# Patient Record
Sex: FEMALE | Race: WHITE | Employment: STUDENT | URBAN - METROPOLITAN AREA
[De-identification: names, ages, dates, MRNs, and addresses within clinical notes are randomized per-mention and may not be internally consistent; named-entity substitution may affect disease eponyms.]

---

## 2020-08-25 ENCOUNTER — OFFICE VISIT (OUTPATIENT)
Dept: FAMILY MEDICINE | Facility: CLINIC | Age: 20
End: 2020-08-25

## 2020-08-25 VITALS
WEIGHT: 115 LBS | TEMPERATURE: 98.6 F | SYSTOLIC BLOOD PRESSURE: 107 MMHG | BODY MASS INDEX: 20.38 KG/M2 | HEART RATE: 81 BPM | DIASTOLIC BLOOD PRESSURE: 73 MMHG | OXYGEN SATURATION: 96 % | HEIGHT: 63 IN

## 2020-08-25 DIAGNOSIS — M08.90 CHRONIC ARTHRITIS OF JUVENILE ONSET (H): Primary | ICD-10-CM

## 2020-08-25 DIAGNOSIS — G89.29 CHRONIC PAIN OF LEFT KNEE: ICD-10-CM

## 2020-08-25 DIAGNOSIS — M25.562 CHRONIC PAIN OF LEFT KNEE: ICD-10-CM

## 2020-08-25 RX ORDER — SULFASALAZINE 500 MG/1
TABLET ORAL
COMMUNITY
Start: 2020-06-04 | End: 2021-06-20

## 2020-08-25 RX ORDER — NORGESTIMATE AND ETHINYL ESTRADIOL 0.25-0.035
1 KIT ORAL
COMMUNITY

## 2020-08-25 ASSESSMENT — ANXIETY QUESTIONNAIRES
5. BEING SO RESTLESS THAT IT IS HARD TO SIT STILL: NOT AT ALL
IF YOU CHECKED OFF ANY PROBLEMS ON THIS QUESTIONNAIRE, HOW DIFFICULT HAVE THESE PROBLEMS MADE IT FOR YOU TO DO YOUR WORK, TAKE CARE OF THINGS AT HOME, OR GET ALONG WITH OTHER PEOPLE: NOT DIFFICULT AT ALL
7. FEELING AFRAID AS IF SOMETHING AWFUL MIGHT HAPPEN: NOT AT ALL
2. NOT BEING ABLE TO STOP OR CONTROL WORRYING: NOT AT ALL
6. BECOMING EASILY ANNOYED OR IRRITABLE: NOT AT ALL
1. FEELING NERVOUS, ANXIOUS, OR ON EDGE: NOT AT ALL
3. WORRYING TOO MUCH ABOUT DIFFERENT THINGS: NOT AT ALL
GAD7 TOTAL SCORE: 0

## 2020-08-25 ASSESSMENT — ENCOUNTER SYMPTOMS
CHILLS: 0
FATIGUE: 0
ARTHRALGIAS: 1
FEVER: 0

## 2020-08-25 ASSESSMENT — PAIN SCALES - GENERAL: PAINLEVEL: NO PAIN (0)

## 2020-08-25 ASSESSMENT — PATIENT HEALTH QUESTIONNAIRE - PHQ9
SUM OF ALL RESPONSES TO PHQ QUESTIONS 1-9: 3
5. POOR APPETITE OR OVEREATING: NOT AT ALL

## 2020-08-25 ASSESSMENT — MIFFLIN-ST. JEOR: SCORE: 1255.77

## 2020-08-25 NOTE — PROGRESS NOTES
"       HPI       Shabnam Yu is a 20 year old woman who presents with concerns in regards to her juvenile onset RA.     Diagnosed at 4 years old with RA in all joints, this went into remission when she was 12 until she was in college. At 2nd onset, she only has problems in her left knee. She has had 2 injections in her left knee; with the last one being in July 2020. The prior injection was 01/01/2020. At 11 years of age, she had an injection in her knee as well. She takes Sulfasalazine, not the full dose as it is hard on her stomach. She does have a Rheumatologist, Dr. Cristino Mckenzie in [facility]. She in interested in a second opinion because she wants to ensure that she is on the most cutting treatment.     Problem, Medication and Allergy Lists were reviewed and updated if needed.    Patient is not an established patient of this clinic.           Review of Systems:   Review of Systems     Constitutional:  Negative for fever, chills and fatigue.   Musculoskeletal:  Positive for arthralgias.               Physical Exam:     Vitals:    08/25/20 1316   BP: 107/73   Pulse: 81   Temp: 98.6  F (37  C)   TempSrc: Oral   SpO2: 96%   Weight: 52.2 kg (115 lb)   Height: 1.6 m (5' 3\")     Body mass index is 20.37 kg/m .  Vitals were reviewed and were normal.     Physical Exam  Vitals signs reviewed.   Constitutional:       Appearance: Normal appearance.   HENT:      Head: Normocephalic.   Musculoskeletal: Normal range of motion.      Left knee: She exhibits normal range of motion. Tenderness found.   Skin:     General: Skin is warm and dry.   Neurological:      General: No focal deficit present.      Mental Status: She is alert and oriented to person, place, and time.   Psychiatric:         Mood and Affect: Mood normal.         Behavior: Behavior normal.         Results:   No labs ordered today.   Assessment and Plan     1. Chronic arthritis of juvenile onset (H)    - RHEUMATOLOGY REFERRAL; Future    2. Chronic " pain of left knee    - RHEUMATOLOGY REFERRAL; Future    There are no discontinued medications.  First, see Rheumatology in consult. Next, return to NP clinic as needed. Thank you. Options for treatment and follow-up care were reviewed with the patient. Shabnam Yu  engaged in the decision making process and verbalized understanding of the options discussed and agreed with the final plan.  Leela Sanchez, MILEY, CNP

## 2020-08-25 NOTE — PATIENT INSTRUCTIONS
First, see Rheumatoid in consult. Next, return to NP clinic as needed. Thank you.   [facility] Medication Refill Request Information:  * Please contact your pharmacy regarding ANY request for medication refills.  ** NP Clinic Prescription Fax = [phone]  * Please allow 3 business days for routine medication refills.  * Please allow 5 business days for controlled substance medication refills.     [facility] Test Result notification information:  *You will be notified with in 7-10 days of your appointment day regarding the results of your test.  If you are on MyChart you will be notified as soon as the provider has reviewed the results and signed off on them.    [facility]: [phone]

## 2020-08-25 NOTE — NURSING NOTE
Chief Complaint   Patient presents with     Arthritis     Discuss rheumatoid arthritis.      Depression Response    Patient completed the PHQ-9 assessment for depression and scored >9? No  Question 9 on the PHQ-9 was positive for suicidality? No  Does patient have current mental health provider? No    Is this a virtual visit? Yes   Does patient have suicidal ideation (positive question 9)? No - offer to place Mental Health Referral.  Patient declined referral/not needed    I personally notified the following: visit provider       MARILOU Chapin 1:22 PM  8/25/2020

## 2020-08-26 ASSESSMENT — ANXIETY QUESTIONNAIRES: GAD7 TOTAL SCORE: 0

## 2020-08-27 ENCOUNTER — TELEPHONE (OUTPATIENT)
Dept: RHEUMATOLOGY | Facility: CLINIC | Age: 20
End: 2020-08-27

## 2020-08-27 NOTE — TELEPHONE ENCOUNTER
"Patient is referred by Leela Sanchez NP for 2nd opinion for JRA .    Has patient seen a Rheumatologist in the past? Patient is currently established with Dr. Mckenzie at [facility] but wants to explore all treatment options. She is a student at the [employer] and is also interested in transferring her care to us.     Patient complains of: joint pain, tenderness in left knee    Per referring upon exam:    \"Physical Exam  Vitals signs reviewed.   Constitutional:       Appearance: Normal appearance.   HENT:      Head: Normocephalic.   Musculoskeletal: Normal range of motion.      Left knee: She exhibits normal range of motion. Tenderness found.   Skin:     General: Skin is warm and dry.   Neurological:      General: No focal deficit present.      Mental Status: She is alert and oriented to person, place, and time.   Psychiatric:         Mood and Affect: Mood normal.         Behavior: Behavior normal\"    Has patient had imaging that pertains to referral? Yes, available in Care Everywhere    Has patient had labs that pertains to referral reason: yes, available in Care Everywhere    Spoke with patient and offered an appointment 9/10/2020 at 1pm with Dr. Keith, patient accepted.     Task to sent to schedulers.     Lotus Stone CMA   8/27/2020 12:03 PM      "

## 2020-09-09 NOTE — PROGRESS NOTES
Rheumatology Clinic Visit 1-remote     Shabnam Yu MRN# [id number]   YOB: 2000 Age: 20 year old     Date of Visit: 09/09/2020  Primary care provider: Bebe Bartlett          Assessment and Plan:     # Recurrent oligoarticular inflammatory arthritis: Patient reports recent increase in the frequency and duration of flares affecting the left knee.  Knee exam by video today shows  the left knee displaying a loss of 5 degrees of full flexion without gross effusion. Blood work on June 25, 2020 showed white count of 5.6, hemoglobin of 12.9; ALT normal; CRP normal.  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 There has been excellent response to left knee steroid injections, twice since mid-2019, but symptoms have recurred and have lingered.  Ongoing symptoms suggest that persistent inflammation may be present, and I recommend antirheumatic medication to suppress synovitis recurrence and prevent joint damage.  Sulfasalazine is a solid, evidence-based choice, but the patient reports difficulty with tolerating this medication due to persistent GI AE. Accordingly, I recommend consideration of alternatives to sulfasalazine, including methotrexate or Humira. We discussed risks/benefits/advantages of methotrexate and Humira.  Methotrexate, although orally available and cost effective, requires more frequent laboratory monitoring during use, and cannot be used around the time of conception or pregnancy.  Humira must be injected subcutaneously, and slightly increases risks of opportunistic infection and malignancy.  Both medications would be equally likely to be successful in suppressing inflammatory arthritis, and both have comparable safety profiles.  I would be comfortable with the patient using either approach initially.    Patient described a desire to speak with her parents about  the advisability of starting medication, and to consider the risks benefits, disadvantages, and advantages in greater detail.  I stated that our office will be happy to respond with a prescription if/when the patient is comfortable.  I recommended that she contact our office if she experiences recurrence of flaring knee swelling and pain; in person visit with a view towards aspiration/injection once again could be contemplated.  I recommended renewing basic laboratory studies including liver function test, complete blood count, and creatinine in preparation for starting use of a long-acting antirheumatic medication.    RTC 3 months.    Quang Keith MD  Staff Rheumatologist, [facility]            Active Problem List:     Patient Active Problem List    Diagnosis Date Noted     AYDEN (juvenile idiopathic arthritis) (H) 07/14/2014     Priority: Medium     Myopia 07/14/2014     Priority: Medium            History of Present Illness:   Shabnam Yu presents for 2nd opinion regarding management of juvenile rheumatoid arthritis. She is referred by provider STEPHANY Sanchez.    At the age of 4 patient developed problems with swelling in her knees and hands. Her parents noted alteration in her knee anatomy during soccer practice. She ultimately was diagnosed as having juvenile rheumatoid arthritis with many involved joints (fingers, toes, and knees) and saw Dr. MORALES Silva, a pediatric rheumatologist at [facility]. She was treated with methotrexate and at the age of 11 went into remission. At age 12, she had stopped medication, but her left knee remained swollen. She had an injection in the left knee.    Since then, she reports several instances of swelling and pain in her knee. Those were short lived. In late 2019 she began having recurrent difficulties with pain and swelling in the left knee.  She saw Dr. Mckenzie in rheumatology at [facility].  Flare of inflammatory arthritis in the left knee was  "diagnosed, and the knee was aspirated and injected.  She saw Dr. Mckenzie and follow-up virtually on Patito 3, 2020.  At that time excellent and durable response to   left knee injections was reported, but the left knee had again become swollen, stiff, and painful.  At that point, the plan was to use scheduled naproxen and to start sulfasalazine.  In July, she followed up with Dr. Mckenzie, and reported poor tolerance of naproxen and intermittent continued left knee swelling and pain.  Dr. Mckenzie recommended advancing the dose of sulfasalazine.    On July 11, the L knee again swelling, and was aspirated and injected again. All the swelling has since disappeared. She does not have pain on a daily basis. She has been hiking and canoeing without difficulty. Somestimes, the left knee feels \"off\" compared to the R, but no swelling/loss of ROM. No redness/warmth.    She has had no problems with pain or swelling in joints elsewhere recently with the exception of pain in the right TMJ region.    In the recent past she has been using 600 mg twice daily of ibuprofen. She developed stomach upset and chest pain. She stopped it and that has improved. Sulfasalazine has also been difficult to take because of heartburn, and she has taken it only occasionally at reduced doses since visiting with Dr. Mckenzie.    She has been under increasing stress at school. She has had no history of psoriasis or photosensitivity. There is no history of iritis or uveitis, dry eyes or dry mouth, she has had no problems with pleurisy or asthma. There is no history of stomach ulcers or inflammatory bowel disease. She has had no problems with kidney or liver disease, diabetes or thyroid disease, cytopenias or neurologic disease. There is no history of hypertension.            Review of Systems:       Constitutional: occasional low-grade fevers, no chills/sweats.  Skin: small red bumps on the backs of her fingers.  Eyes: no eye pain or " vision change  Ears/Nose/Throat: negative  Respiratory: she has SOB, associated with anxiety  Cardiovascular: negative  Gastrointestinal: + GERD symptoms.  Genitourinary: negative  Musculoskeletal: negative  Neurologic: negative  Psychiatric: negative  Hematologic/Lymphatic/Immunologic: swollen neck gland  Endocrine: negative          Past Medical History:     Past Medical History:   Diagnosis Date     Anxiety      Depressive disorder      Juvenile arthritis (H) 01/2004     Past Surgical History:   Procedure Laterality Date     CARDIAC SURGERY N/A 01/2004     # congenital heart disease: septal defect which was treated with a coiling procedure at age 4.  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  Activity: She walks regularly at school. Has not been exercising regularly. Used to run call cross-country in high school.         Family History:     Family History   Problem Relation Age of Onset     Cancer Maternal Grandmother      Cancer Paternal Grandmother      Cancer Paternal Grandfather      Family History: Negative with respect to inflammatory disease.       Allergies:   No Known Allergies         Medications:     Current Outpatient Medications   Medication Sig Dispense Refill     norgestimate-ethinyl estradiol (ORTHO-CYCLEN) 0.25-35 MG-MCG tablet Take 1 tablet by mouth       omeprazole (PRILOSEC) 20 MG DR capsule Take 20 mg by mouth       sertraline (ZOLOFT) 50 MG tablet Take 50 mg by mouth daily       sulfaSALAzine (AZULFIDINE) 500 MG tablet 500 mg twice daily for 1 week, then 1000  mg twice daily.       SULFASALAZINE dosin mg once or rarely twice daily.       Physical Exam:   There were no vitals taken for this visit.  Wt Readings from Last 4 Encounters:   20 52.2 kg (115 lb)       Constitutional: well-developed, appearing stated age; cooperative  Eyes: nl EOM, PERRLA, vision, conjunctiva, sclera  ENT: nl external ears, nose, hearing, lips, teeth, gums, throat  No mucous membrane lesions, normal saliva pool  Neck: no mass or thyroid enlargement  Resp: lungs clear to auscultation, nl to palpation  CV: RRR, no murmurs, rubs or gallops, no edema  GI: no ABD mass or tenderness, no HSM  : not tested  Lymph: no cervical, supraclavicular, inguinal or epitrochlear nodes  MS: The TMJ, neck, shoulder, elbow, wrist, MCP/PIP/DIP, spine, hip, knee, ankle, and foot MTP/IP joints were examined and found normal. L knee lacks 5 degrees of active flexion; no visible swelling or warmth. Otherwise, joint ROM is entirely normal.  Skin: no nail pitting, alopecia, rash, nodules or lesions  Neuro: nl cranial nerves, strength, sensation, DTRs.   Psych: nl judgement, orientation, memory, affect.         Data:     RHEUM RESULTS Latest Ref Rng & Units 2010 2011 6/10/2011   SED RATE 0 - 15 mm/h 10 10 8   AST 0 - 50 U/L - - -   ALT 0 - 50 U/L 24 24 9   ALBUMIN 3.9 - 5.1 g/dL - - -   WBC 4.0 - 11.0 10e9/L 4.3 4.4 5.4   RBC 3.7 - 5.3 10e12/L 4.22 4.53 4.06   HGB 11.7 - 15.7 g/dL 13.1 13.9 12.8   HCT 35.0 - 47.0 % 38.0 40.3 36.4   MCV 77 - 100 fl 90 89 90   MCHC 31.5 - 36.5 g/dL 34.5 34.5 35.2   RDW 10.0 - 15.0 % 13.0 13.1 12.6    - 450 10e9/L 238 229 196   CREATININE 0.39 - 0.73 mg/dL 0.59 0.64 0.56   GFR ESTIMATE, IF BLACK mL/min/1.7m2 GFR not calculated, patient <16 years old. GFR not calculated, patient <16 years old. GFR not calculated, patient <16 years old.   GFR ESTIMATE mL/min/1.7m2 GFR not calculated, patient <16 years old. GFR not calculated, patient <16 years old. GFR not  "calculated, patient <16 years old.         Reviewed Rheumatology lab flowsheet    Patient states she is not consistent in the use of sulfasalazine as it upsets her stomach.    Shabnam Yu is a 20 year old female who is being evaluated via a billable video visit.      The patient has been notified of following:     \"This video visit will be conducted via a call between you and your physician/provider. We have found that certain health care needs can be provided without the need for an in-person physical exam.  This service lets us provide the care you need with a video conversation.  If a prescription is necessary we can send it directly to your pharmacy.  If lab work is needed we can place an order for that and you can then stop by our lab to have the test done at a later time.    Video visits are billed at different rates depending on your insurance coverage.  Please reach out to your insurance provider with any questions.    If during the course of the call the physician/provider feels a video visit is not appropriate, you will not be charged for this service.\"    Patient has given verbal consent for Video visit? Yes  How would you like to obtain your AVS? MyChart    Will anyone else be joining your video visit? No        Video-Visit Details    Type of service:  Video Visit    Video Start Time: 1:07 PM  Video End Time: 2:03 PM    Originating Location (pt. Location): Home    Distant Location (provider location):   [facility]     Platform used for Video Visit: Arvin Keith MD        "

## 2020-09-10 ENCOUNTER — VIRTUAL VISIT (OUTPATIENT)
Dept: RHEUMATOLOGY | Facility: CLINIC | Age: 20
End: 2020-09-10
Attending: NURSE PRACTITIONER
Payer: COMMERCIAL

## 2020-09-10 DIAGNOSIS — M25.562 CHRONIC PAIN OF LEFT KNEE: ICD-10-CM

## 2020-09-10 DIAGNOSIS — M08.90 CHRONIC ARTHRITIS OF JUVENILE ONSET (H): ICD-10-CM

## 2020-09-10 DIAGNOSIS — G89.29 CHRONIC PAIN OF LEFT KNEE: ICD-10-CM

## 2020-09-10 SDOH — HEALTH STABILITY: MENTAL HEALTH: HOW OFTEN DO YOU HAVE A DRINK CONTAINING ALCOHOL?: 2-3 TIMES A WEEK

## 2020-09-10 ASSESSMENT — PAIN SCALES - GENERAL: PAINLEVEL: NO PAIN (0)

## 2020-09-10 NOTE — PATIENT INSTRUCTIONS
Diagnosis:  1.  Recurrent oligoarticular inflammatory arthritis, likely due to juvenile idiopathic arthritis, largely affecting the left knee only.  There is been excellent response to left knee injections twice in the past year, but symptoms have recurred and have lingered.  Ongoing symptoms suggest that deeper inflammation may be present, and I recommend antirheumatic medication.  Sulfasalazine is a decent choice, but I understand that you have had difficulty with tolerating this medication, and therefore I recommend switching to 1 of 2 options, methotrexate or Humira.  Humira is the biologic and must be injected; methotrexate is a pill or liquid that can be taken orally once a week.  Both medications would be equally likely to be successful in suppressing inflammatory arthritis, and both have comparable safety profiles.    Plan:  1.  After you have decided which of these options you would like to pursue, (if either), undergo blood work to check liver function, kidney function complete blood count, and markers of infection, before starting a new medication.  2.  For now discontinue sulfasalazine, unless you are strongly interested in pursuing the enteric-coated version.  In that case, I will prescribe enteric-coated sulfasalazine and recommend 1000 mg twice daily.  3.  Let my office know if you have recurrence of knee swelling or pain, or other joint related difficulties.  Our office will wait for your indication about when to start medication when you have had a chance to discuss these matters with family.

## 2020-09-10 NOTE — LETTER
9/10/2020       RE: Shabnam Yu  [address] [facility] [address]     Dear Colleague,    Thank you for referring your patient, Shabnam Yu, to the [facility] RHEUMATOLOGY at [facility]. Please see a copy of my visit note below.    Rheumatology Clinic Visit 1-remote     Shabnam Yu MRN# [id number]   YOB: 2000 Age: 20 year old     Date of Visit: 09/09/2020  Primary care provider: Bebe Bartlett          Assessment and Plan:     # Recurrent oligoarticular inflammatory arthritis: Patient reports recent increase in the frequency and duration of flares affecting the left knee.  Knee exam by video today shows  the left knee displaying a loss of 5 degrees of full flexion without gross effusion. Blood work on June 25, 2020 showed white count of 5.6, hemoglobin of 12.9; ALT normal; CRP normal.  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 There has been excellent response to left knee steroid injections, twice since mid-2019, but symptoms have recurred and have lingered.  Ongoing symptoms suggest that persistent inflammation may be present, and I recommend antirheumatic medication to suppress synovitis recurrence and prevent joint damage.  Sulfasalazine is a solid, evidence-based choice, but the patient reports difficulty with tolerating this medication due to persistent GI AE. Accordingly, I recommend consideration of alternatives to sulfasalazine, including methotrexate or Humira. We discussed risks/benefits/advantages of methotrexate and Humira.  Methotrexate, although orally available and cost effective, requires more frequent laboratory monitoring during use, and cannot be used around the time of conception or pregnancy.  Humira must be injected subcutaneously, and slightly increases risks of opportunistic infection  and malignancy.  Both medications would be equally likely to be successful in suppressing inflammatory arthritis, and both have comparable safety profiles.  I would be comfortable with the patient using either approach initially.    Patient described a desire to speak with her parents about the advisability of starting medication, and to consider the risks benefits, disadvantages, and advantages in greater detail.  I stated that our office will be happy to respond with a prescription if/when the patient is comfortable.  I recommended that she contact our office if she experiences recurrence of flaring knee swelling and pain; in person visit with a view towards aspiration/injection once again could be contemplated.  I recommended renewing basic laboratory studies including liver function test, complete blood count, and creatinine in preparation for starting use of a long-acting antirheumatic medication.    RTC 3 months.    Quang Keith MD  Staff Rheumatologist, [facility]            Active Problem List:     Patient Active Problem List    Diagnosis Date Noted     AYDEN (juvenile idiopathic arthritis) (H) 07/14/2014     Priority: Medium     Myopia 07/14/2014     Priority: Medium            History of Present Illness:   Shabnam Yu presents for 2nd opinion regarding management of juvenile rheumatoid arthritis. She is referred by provider STEPHANY Sanchez.    At the age of 4 patient developed problems with swelling in her knees and hands. Her parents noted alteration in her knee anatomy during soccer practice. She ultimately was diagnosed as having juvenile rheumatoid arthritis with many involved joints (fingers, toes, and knees) and saw Dr. MORALES Silva, a pediatric rheumatologist at [facility]. She was treated with methotrexate and at the age of 11 went into remission. At age 12, she had stopped medication, but her left knee remained swollen. She had an injection in the left knee.    Since then, she  "reports several instances of swelling and pain in her knee. Those were short lived. In late 2019 she began having recurrent difficulties with pain and swelling in the left knee.  She saw Dr. Mckenzie in rheumatology at [facility].  Flare of inflammatory arthritis in the left knee was diagnosed, and the knee was aspirated and injected.  She saw Dr. Mckenzie and follow-up virtually on Patito 3, 2020.  At that time excellent and durable response to   left knee injections was reported, but the left knee had again become swollen, stiff, and painful.  At that point, the plan was to use scheduled naproxen and to start sulfasalazine.  In July, she followed up with Dr. Mckenzie, and reported poor tolerance of naproxen and intermittent continued left knee swelling and pain.  Dr. Mckenzie recommended advancing the dose of sulfasalazine.    On July 11, the L knee again swelling, and was aspirated and injected again. All the swelling has since disappeared. She does not have pain on a daily basis. She has been hiking and canoeing without difficulty. Somestimes, the left knee feels \"off\" compared to the R, but no swelling/loss of ROM. No redness/warmth.    She has had no problems with pain or swelling in joints elsewhere recently with the exception of pain in the right TMJ region.    In the recent past she has been using 600 mg twice daily of ibuprofen. She developed stomach upset and chest pain. She stopped it and that has improved. Sulfasalazine has also been difficult to take because of heartburn, and she has taken it only occasionally at reduced doses since visiting with Dr. Mckenzie.    She has been under increasing stress at school. She has had no history of psoriasis or photosensitivity. There is no history of iritis or uveitis, dry eyes or dry mouth, she has had no problems with pleurisy or asthma. There is no history of stomach ulcers or inflammatory bowel disease. She has had no problems with kidney or " liver disease, diabetes or thyroid disease, cytopenias or neurologic disease. There is no history of hypertension.            Review of Systems:       Constitutional: occasional low-grade fevers, no chills/sweats.  Skin: small red bumps on the backs of her fingers.  Eyes: no eye pain or vision change  Ears/Nose/Throat: negative  Respiratory: she has SOB, associated with anxiety  Cardiovascular: negative  Gastrointestinal: + GERD symptoms.  Genitourinary: negative  Musculoskeletal: negative  Neurologic: negative  Psychiatric: negative  Hematologic/Lymphatic/Immunologic: swollen neck gland  Endocrine: negative          Past Medical History:     Past Medical History:   Diagnosis Date     Anxiety      Depressive disorder      Juvenile arthritis (H) 01/2004     Past Surgical History:   Procedure Laterality Date     CARDIAC SURGERY N/A 01/2004     # congenital heart disease: septal defect which was treated with a coiling procedure at age 4.  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.  Activity: She walks regularly at school. Has not been exercising regularly. Used to run call cross-country in high school.         Family History:     Family History   Problem Relation Age of Onset     Cancer Maternal Grandmother      Cancer Paternal Grandmother      Cancer Paternal Grandfather      Family History: Negative with respect to inflammatory disease.       Allergies:   No Known Allergies         Medications:     Current Outpatient Medications   Medication Sig Dispense Refill     norgestimate-ethinyl  estradiol (ORTHO-CYCLEN) 0.25-35 MG-MCG tablet Take 1 tablet by mouth       omeprazole (PRILOSEC) 20 MG DR capsule Take 20 mg by mouth       sertraline (ZOLOFT) 50 MG tablet Take 50 mg by mouth daily       sulfaSALAzine (AZULFIDINE) 500 MG tablet 500 mg twice daily for 1 week, then 1000 mg twice daily.       SULFASALAZINE dosin mg once or rarely twice daily.       Physical Exam:   There were no vitals taken for this visit.  Wt Readings from Last 4 Encounters:   20 52.2 kg (115 lb)       Constitutional: well-developed, appearing stated age; cooperative  Eyes: nl EOM, PERRLA, vision, conjunctiva, sclera  ENT: nl external ears, nose, hearing, lips, teeth, gums, throat  No mucous membrane lesions, normal saliva pool  Neck: no mass or thyroid enlargement  Resp: lungs clear to auscultation, nl to palpation  CV: RRR, no murmurs, rubs or gallops, no edema  GI: no ABD mass or tenderness, no HSM  : not tested  Lymph: no cervical, supraclavicular, inguinal or epitrochlear nodes  MS: The TMJ, neck, shoulder, elbow, wrist, MCP/PIP/DIP, spine, hip, knee, ankle, and foot MTP/IP joints were examined and found normal. L knee lacks 5 degrees of active flexion; no visible swelling or warmth. Otherwise, joint ROM is entirely normal.  Skin: no nail pitting, alopecia, rash, nodules or lesions  Neuro: nl cranial nerves, strength, sensation, DTRs.   Psych: nl judgement, orientation, memory, affect.         Data:     RHEUM RESULTS Latest Ref Rng & Units 2010 2011 6/10/2011   SED RATE 0 - 15 mm/h 10 10 8   AST 0 - 50 U/L - - -   ALT 0 - 50 U/L 24 24 9   ALBUMIN 3.9 - 5.1 g/dL - - -   WBC 4.0 - 11.0 10e9/L 4.3 4.4 5.4   RBC 3.7 - 5.3 10e12/L 4.22 4.53 4.06   HGB 11.7 - 15.7 g/dL 13.1 13.9 12.8   HCT 35.0 - 47.0 % 38.0 40.3 36.4   MCV 77 - 100 fl 90 89 90   MCHC 31.5 - 36.5 g/dL 34.5 34.5 35.2   RDW 10.0 - 15.0 % 13.0 13.1 12.6    - 450 10e9/L 238 229 196   CREATININE 0.39 - 0.73 mg/dL 0.59 0.64 0.56   GFR  "ESTIMATE, IF BLACK mL/min/1.7m2 GFR not calculated, patient <16 years old. GFR not calculated, patient <16 years old. GFR not calculated, patient <16 years old.   GFR ESTIMATE mL/min/1.7m2 GFR not calculated, patient <16 years old. GFR not calculated, patient <16 years old. GFR not calculated, patient <16 years old.         Reviewed Rheumatology lab flowsheet    Patient states she is not consistent in the use of sulfasalazine as it upsets her stomach.    Shabnam Yu is a 20 year old female who is being evaluated via a billable video visit.      The patient has been notified of following:     \"This video visit will be conducted via a call between you and your physician/provider. We have found that certain health care needs can be provided without the need for an in-person physical exam.  This service lets us provide the care you need with a video conversation.  If a prescription is necessary we can send it directly to your pharmacy.  If lab work is needed we can place an order for that and you can then stop by our lab to have the test done at a later time.    Video visits are billed at different rates depending on your insurance coverage.  Please reach out to your insurance provider with any questions.    If during the course of the call the physician/provider feels a video visit is not appropriate, you will not be charged for this service.\"    Patient has given verbal consent for Video visit? Yes  How would you like to obtain your AVS? MyChart    Will anyone else be joining your video visit? No        Video-Visit Details    Type of service:  Video Visit    Video Start Time: 1:07 PM  Video End Time: 2:03 PM    Originating Location (pt. Location): Home    Distant Location (provider location):  [facility] RHEUMATOLOGY     Platform used for Video Visit: Arvin Keith MD        "

## 2020-09-16 ENCOUNTER — TELEPHONE (OUTPATIENT)
Dept: RHEUMATOLOGY | Facility: CLINIC | Age: 20
End: 2020-09-16

## 2020-09-16 NOTE — TELEPHONE ENCOUNTER
[facility] Call Center    Phone Message    May a detailed message be left on voicemail: yes     Reason for Call: Other: Pt needs ASAP appt for knee. Pain is worsening. Please call her to discuss     Action Taken: Message routed to:  [facility] Rheum    Travel Screening: Not Applicable

## 2020-09-17 NOTE — TELEPHONE ENCOUNTER
Spoke to Shabnam and she is reporting that she is having pain and swelling in her left knee which started a few days ago.  The area does feel warm to the touch, is not red.  When her left knee is swollen, it does make this very difficult for her to walk.  She has tried ice but this has not help and sometimes she does wear a compression brace but does not help.      Shabnam is not taking anything over the counter.    Shabnam would like to have an onsite appointment with Dr. Keith with her family to discuss medication options and Shabnam would also like Dr. Keith to see her knee.    Will route to Dr. Keith for recommendations.    Jessica Anderson MSN, RN  Rheumatology RN Care Coordinator  [facility]

## 2020-09-22 NOTE — TELEPHONE ENCOUNTER
[facility] Call Center    Phone Message    May a detailed message be left on voicemail: yes     Reason for Call: Other: Pt still awaiting call back regarding request.     Action Taken: Message routed to:  [facility] Rheum    Travel Screening: Not Applicable

## 2020-09-23 NOTE — TELEPHONE ENCOUNTER
Spoke to Shabnam and she is not able to make the appointment 9/24 at 1030AM as she has class during this time.     Sent the notification to Dr. Keith.     Jessica Anderson MSN, RN  Rheumatology RN Care Coordinator  [facility]

## 2020-09-23 NOTE — TELEPHONE ENCOUNTER
Called Shabnam but was not able to leave a voice message because mail box is full.  Will send her [employer] message as well.     Also tried the fathers number listed in the chart and recording states the number is not a working number.    Jessica Anderson MSN, RN  Rheumatology RN Care Coordinator  [facility]

## 2020-09-24 NOTE — TELEPHONE ENCOUNTER
Left message for Shabnam to return my call but will also send her mychart message.     Jessica Anderson MSN, RN  Rheumatology RN Care Coordinator  [facility]

## 2020-09-28 NOTE — TELEPHONE ENCOUNTER
Spoke to Shabnam and offered appointment for 9/29 at 8AM; she will be able to make this appointment date and time.    Can the patient come tomorrow morning at 8:00?  If so, perhaps we can split the open 1 hour slot at 8:00 on September 29, and the patient can have the first 30 minutes.    Jessica Anderson MSN, RN  Rheumatology RN Care Coordinator   [facility]

## 2020-09-28 NOTE — PROGRESS NOTES
Rheumatology Clinic Visit     Shabnam Yu MRN# [id number]   YOB: 2000 Age: 20 year old     Date of Visit: 09/29/2020  Primary care provider: Bebe Bartlett          Assessment and Plan:     # Recurrent oligoarticular inflammatory arthritis:   Patient relates waxing and waning left knee stiffness and pain, nearly continuous for the last 3 weeks.  Examination shows small, warm effusion on the left knee.  There is painful flexion beyond 130 degrees, but the knee shows full extension. Blood work on June 25, 2020 showed white count of 5.6, hemoglobin of 12.9; ALT normal; CRP normal.  Lyme disease titers and CRP were negative or normal on December 31, 2019.    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 There has been excellent response to left knee steroid injections, twice since mid-2019, but symptoms have recurred and now have become more continuous and increasingly disruptive of activities of daily living.  Ongoing symptoms suggest that persistent inflammation is present, and I recommend antirheumatic medication to suppress synovitis recurrence and prevent joint damage.  Sulfasalazine is a solid, evidence-based choice, but the patient reports difficulty with tolerating this medication due to persistent GI AE.     I had a long discussion with the patient (in person) and her mother (present by telephone) today about alternatives to sulfasalazine, including methotrexate or Humira. We discussed risks/benefits/advantages of methotrexate and Humira.  Methotrexate, although orally available and cost effective, requires more frequent laboratory monitoring during use, and cannot be used around the time of conception or pregnancy.  Humira must be injected subcutaneously, and slightly increases risks of opportunistic infection and malignancy.  Both medications would be equally likely to be successful in suppressing inflammatory arthritis, and both have  comparable safety profiles.  I would be comfortable with the patient using either approach initially.    I answered all questions to the best of my ability.  Ultimately, the patient described her desire to utilize Humira, due to the fact that she does consume alcohol weekly, and due to the potential teratogenic effect of methotrexate.  I agree with this plan.  I recommended again renewing basic laboratory studies including liver function test, complete blood count, and creatinine as well as testing for TB, hepatitis in preparation for starting use of a long-acting antirheumatic medication. For safety, further knee aspiration/injections should be delayed until 3 months have elapsed since the last injection in early July 2020.    Detailed plan:  1.  Check blood work for previous tuberculosis exposure, hepatitis.  2.  Begin Humira 40 mg subcu every other week.  3.  Let our office know if you experience any adverse effects of using the Humira  4. Flu shot recommended before starting humira.    RTC 3 months.    Quang Keith MD  Staff Rheumatologist, [facility] spent >50% of this 40 minute encounter in counseling and coordination of care regarding the patient's complex medical problem of juvenile inflammatory arthritis.          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 She was seen for initial visit on 9-.  Consideration of methotrexate versus Humira was recommended at that time.    Interval history 09-:    In eraly August, she had an episode of marked swelling and reduced range of motion in the L knee. Since then, she has noted continuous discomfort, waxing and waning, with morning stiffness.  She has not used ibuprofen because she had  "significant GERD symptoms. Alleve has been well-tolerated.    Interval hx 9-:  At the age of 4 patient developed problems with swelling in her knees and hands. Her parents noted alteration in her knee anatomy during soccer practice. She ultimately was diagnosed as having juvenile rheumatoid arthritis with many involved joints (fingers, toes, and knees) and saw Dr. MORALES Silva, a pediatric rheumatologist at [facility]. She was treated with methotrexate and at the age of 11 went into remission. At age 12, she had stopped medication, but her left knee remained swollen. She had an injection in the left knee.    Since then, she reports several instances of swelling and pain in her knee. Those were short lived. In late 2019 she began having recurrent difficulties with pain and swelling in the left knee.  She saw Dr. Mckenzie in rheumatology at [facility].  Flare of inflammatory arthritis in the left knee was diagnosed, and the knee was aspirated and injected.  She saw Dr. Mckenzie in follow-up virtually on Patito 3, 2020.  At that time excellent and durable response to   left knee injections was reported, but the left knee had again become swollen, stiff, and painful.  At that point, the plan was to use scheduled naproxen and to start sulfasalazine.  In July, she followed up with Dr. Mckenzie, and reported poor tolerance of naproxen and intermittent continued left knee swelling and pain.  Dr. Mckenzie recommended advancing the dose of sulfasalazine.    On July 11, the L knee again swelling, and was aspirated and injected again. All the swelling has since disappeared. She does not have pain on a daily basis. She has been hiking and canoeing without difficulty. Somestimes, the left knee feels \"off\" compared to the R, but no swelling/loss of ROM. No redness/warmth.    She has had no problems with pain or swelling in joints elsewhere recently with the exception of pain in the right TMJ " region.    In the recent past she has been using 600 mg twice daily of ibuprofen. She developed stomach upset and chest pain. She stopped it and that has improved. Sulfasalazine has also been difficult to take because of heartburn, and she has taken it only occasionally at reduced doses since visiting with Dr. Mckenzie.    She has been under increasing stress at school. She has had no history of psoriasis or photosensitivity. There is no history of iritis or uveitis, dry eyes or dry mouth, she has had no problems with pleurisy or asthma. There is no history of stomach ulcers or inflammatory bowel disease. She has had no problems with kidney or liver disease, diabetes or thyroid disease, cytopenias or neurologic disease. There is no history of hypertension.            Review of Systems:       Constitutional: occasional low-grade fevers, no chills/sweats.  Skin: small red bumps on the backs of her fingers.  Eyes: no eye pain or vision change  Ears/Nose/Throat: negative  Respiratory: she has SOB, associated with anxiety  Cardiovascular: negative  Gastrointestinal: + GERD symptoms.  Genitourinary: negative  Musculoskeletal: negative  Neurologic: negative  Psychiatric: negative  Hematologic/Lymphatic/Immunologic: swollen neck gland  Endocrine: negative          Past Medical History:     Past Medical History:   Diagnosis Date     Anxiety      Depressive disorder      Juvenile arthritis (H) 01/2004     Past Surgical History:   Procedure Laterality Date     CARDIAC SURGERY N/A 01/2004     # congenital heart disease: septal defect which was treated with a coiling procedure at age 4.  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  Activity: She walks regularly at school. Has not been exercising regularly. Used to run call cross-country in high school.         Family History:     Family History   Problem Relation Age of Onset     Cancer Maternal Grandmother      Cancer Paternal Grandmother      Cancer Paternal Grandfather      Family History: Negative with respect to inflammatory disease.       Allergies:   No Known Allergies         Medications:     Current Outpatient Medications   Medication Sig Dispense Refill     norgestimate-ethinyl estradiol (ORTHO-CYCLEN) 0.25-35 MG-MCG tablet Take 1 tablet by mouth       sertraline (ZOLOFT) 50 MG tablet Take 50 mg by mouth daily       omeprazole (PRILOSEC) 20 MG DR capsule Take 20 mg by mouth       sulfaSALAzine (AZULFIDINE) 500 MG tablet 500 mg twice daily for 1 week, then 1000 mg twice daily.              Physical Exam:   Blood pressure 101/66, pulse 79, temperature 98.4  F (36.9  C), weight 55.1 kg (121 lb 8 oz), SpO2 96 %.  Wt Readings from Last 4 Encounters:   09/29/20 55.1 kg (121 lb 8 oz)   08/25/20 52.2 kg (115 lb)       Constitutional: well-developed, appearing stated age; cooperative  Eyes: nl EOM, PERRLA, vision, conjunctiva, sclera  ENT: nl external ears, nose, hearing, lips, teeth, gums, throat  No mucous membrane lesions, normal saliva pool  Neck: no mass or thyroid enlargement  Resp: lungs clear to auscultation, nl to palpation  CV: RRR, no murmurs, rubs or gallops, no edema  GI: no ABD mass or tenderness, no HSM  MS: The TMJ, neck, shoulder, elbow, wrist, MCP/PIP/DIP, spine, hip, knee, ankle, and foot MTP/IP joints were examined. L knee lacks 10 degrees of active flexion; there is a warm, small ballotable effusion. Otherwise, joint ROM is entirely normal.  Skin: no nail pitting, alopecia, rash, nodules or lesions  Neuro: nl  cranial nerves, strength, sensation, DTRs.   Psych: nl judgement, orientation, memory, affect.         Data:     RHEUM RESULTS Latest Ref Rng & Units 11/18/2010 2/24/2011 6/10/2011   SED RATE 0 - 15 mm/h 10 10 8   AST 0 - 50 U/L - - -   ALT 0 - 50 U/L 24 24 9   ALBUMIN 3.9 - 5.1 g/dL - - -   WBC 4.0 - 11.0 10e9/L 4.3 4.4 5.4   RBC 3.7 - 5.3 10e12/L 4.22 4.53 4.06   HGB 11.7 - 15.7 g/dL 13.1 13.9 12.8   HCT 35.0 - 47.0 % 38.0 40.3 36.4   MCV 77 - 100 fl 90 89 90   MCHC 31.5 - 36.5 g/dL 34.5 34.5 35.2   RDW 10.0 - 15.0 % 13.0 13.1 12.6    - 450 10e9/L 238 229 196   CREATININE 0.39 - 0.73 mg/dL 0.59 0.64 0.56   GFR ESTIMATE, IF BLACK mL/min/1.7m2 GFR not calculated, patient <16 years old. GFR not calculated, patient <16 years old. GFR not calculated, patient <16 years old.   GFR ESTIMATE mL/min/1.7m2 GFR not calculated, patient <16 years old. GFR not calculated, patient <16 years old. GFR not calculated, patient <16 years old.

## 2020-09-28 NOTE — TELEPHONE ENCOUNTER
Can the patient come tomorrow morning at 8:00?  If so, perhaps we can split the open 1 hour slot at 8:00 on September 29, and the patient can have the first 30 minutes.

## 2020-09-29 ENCOUNTER — OFFICE VISIT (OUTPATIENT)
Dept: RHEUMATOLOGY | Facility: CLINIC | Age: 20
End: 2020-09-29
Attending: INTERNAL MEDICINE
Payer: COMMERCIAL

## 2020-09-29 ENCOUNTER — TELEPHONE (OUTPATIENT)
Dept: RHEUMATOLOGY | Facility: CLINIC | Age: 20
End: 2020-09-29

## 2020-09-29 VITALS
BODY MASS INDEX: 21.52 KG/M2 | HEART RATE: 79 BPM | TEMPERATURE: 98.4 F | WEIGHT: 121.5 LBS | OXYGEN SATURATION: 96 % | SYSTOLIC BLOOD PRESSURE: 101 MMHG | DIASTOLIC BLOOD PRESSURE: 66 MMHG

## 2020-09-29 DIAGNOSIS — M08.90 CHRONIC ARTHRITIS OF JUVENILE ONSET (H): Primary | ICD-10-CM

## 2020-09-29 PROCEDURE — G0463 HOSPITAL OUTPT CLINIC VISIT: HCPCS | Mod: ZF

## 2020-09-29 PROCEDURE — 87340 HEPATITIS B SURFACE AG IA: CPT | Performed by: INTERNAL MEDICINE

## 2020-09-29 ASSESSMENT — PAIN SCALES - GENERAL: PAINLEVEL: NO PAIN (0)

## 2020-09-29 NOTE — NURSING NOTE
Chief Complaint   Patient presents with     RECHECK     RA follow up     /66 (BP Location: Left arm, Patient Position: Sitting, Cuff Size: Adult Regular)   Pulse 79   Temp 98.4  F (36.9  C)   Wt 55.1 kg (121 lb 8 oz)   SpO2 96%   BMI 21.52 kg/m          Juliano Gilmore, EMT

## 2020-09-29 NOTE — LETTER
9/29/2020       RE: Shabnam Yu  [address] [facility] [address]     Dear Colleague,    Thank you for referring your patient, Shabnam Yu, to the [facility] RHEUMATOLOGY at [facility]. Please see a copy of my visit note below.    Rheumatology Clinic Visit     Shabnam Yu MRN# [id number]   YOB: 2000 Age: 20 year old     Date of Visit: 09/29/2020  Primary care provider: Bebe Bartlett          Assessment and Plan:     # Recurrent oligoarticular inflammatory arthritis:   Patient relates waxing and waning left knee stiffness and pain, nearly continuous for the last 3 weeks.  Examination shows small, warm effusion on the left knee.  There is painful flexion beyond 130 degrees, but the knee shows full extension. Blood work on June 25, 2020 showed white count of 5.6, hemoglobin of 12.9; ALT normal; CRP normal.  Lyme disease titers and CRP were negative or normal on December 31, 2019.    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 There has been excellent response to left knee steroid injections, twice since mid-2019, but symptoms have recurred and now have become more continuous and increasingly disruptive of activities of daily living.  Ongoing symptoms suggest that persistent inflammation is present, and I recommend antirheumatic medication to suppress synovitis recurrence and prevent joint damage.  Sulfasalazine is a solid, evidence-based choice, but the patient reports difficulty with tolerating this medication due to persistent GI AE.     I had a long discussion with the patient (in person) and her mother (present by telephone) today about alternatives to sulfasalazine, including methotrexate or Humira. We discussed risks/benefits/advantages of methotrexate and Humira.  Methotrexate, although orally available and cost effective, requires more frequent laboratory monitoring during use,  and cannot be used around the time of conception or pregnancy.  Humira must be injected subcutaneously, and slightly increases risks of opportunistic infection and malignancy.  Both medications would be equally likely to be successful in suppressing inflammatory arthritis, and both have comparable safety profiles.  I would be comfortable with the patient using either approach initially.    I answered all questions to the best of my ability.  Ultimately, the patient described her desire to utilize Humira, due to the fact that she does consume alcohol weekly, and due to the potential teratogenic effect of methotrexate.  I agree with this plan.  I recommended again renewing basic laboratory studies including liver function test, complete blood count, and creatinine as well as testing for TB, hepatitis in preparation for starting use of a long-acting antirheumatic medication. For safety, further knee aspiration/injections should be delayed until 3 months have elapsed since the last injection in early July 2020.    Detailed plan:  1.  Check blood work for previous tuberculosis exposure, hepatitis.  2.  Begin Humira 40 mg subcu every other week.  3.  Let our office know if you experience any adverse effects of using the Humira  4. Flu shot recommended before starting humira.    RTC 3 months.    Quang Keith MD  Staff Rheumatologist, [facility] spent >50% of this 40 minute encounter in counseling and coordination of care regarding the patient's complex medical problem of juvenile inflammatory arthritis.          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 She was seen for initial visit on 9-.  Consideration of methotrexate versus Humira was recommended  at that time.    Interval history 09-:    In eraly August, she had an episode of marked swelling and reduced range of motion in the L knee. Since then, she has noted continuous discomfort, waxing and waning, with morning stiffness.  She has not used ibuprofen because she had significant GERD symptoms. Alleve has been well-tolerated.    Interval hx 9-:  At the age of 4 patient developed problems with swelling in her knees and hands. Her parents noted alteration in her knee anatomy during soccer practice. She ultimately was diagnosed as having juvenile rheumatoid arthritis with many involved joints (fingers, toes, and knees) and saw Dr. MORALES Silva, a pediatric rheumatologist at [facility]. She was treated with methotrexate and at the age of 11 went into remission. At age 12, she had stopped medication, but her left knee remained swollen. She had an injection in the left knee.    Since then, she reports several instances of swelling and pain in her knee. Those were short lived. In late 2019 she began having recurrent difficulties with pain and swelling in the left knee.  She saw Dr. Mckenzie in rheumatology at [facility].  Flare of inflammatory arthritis in the left knee was diagnosed, and the knee was aspirated and injected.  She saw Dr. Mckenzie in follow-up virtually on Patito 3, 2020.  At that time excellent and durable response to   left knee injections was reported, but the left knee had again become swollen, stiff, and painful.  At that point, the plan was to use scheduled naproxen and to start sulfasalazine.  In July, she followed up with Dr. Mckenzie, and reported poor tolerance of naproxen and intermittent continued left knee swelling and pain.  Dr. Mckenzie recommended advancing the dose of sulfasalazine.    On July 11, the L knee again swelling, and was aspirated and injected again. All the swelling has since disappeared. She does not have pain on a daily basis.  "She has been hiking and canoeing without difficulty. Somestimes, the left knee feels \"off\" compared to the R, but no swelling/loss of ROM. No redness/warmth.    She has had no problems with pain or swelling in joints elsewhere recently with the exception of pain in the right TMJ region.    In the recent past she has been using 600 mg twice daily of ibuprofen. She developed stomach upset and chest pain. She stopped it and that has improved. Sulfasalazine has also been difficult to take because of heartburn, and she has taken it only occasionally at reduced doses since visiting with Dr. Mckenzie.    She has been under increasing stress at school. She has had no history of psoriasis or photosensitivity. There is no history of iritis or uveitis, dry eyes or dry mouth, she has had no problems with pleurisy or asthma. There is no history of stomach ulcers or inflammatory bowel disease. She has had no problems with kidney or liver disease, diabetes or thyroid disease, cytopenias or neurologic disease. There is no history of hypertension.            Review of Systems:       Constitutional: occasional low-grade fevers, no chills/sweats.  Skin: small red bumps on the backs of her fingers.  Eyes: no eye pain or vision change  Ears/Nose/Throat: negative  Respiratory: she has SOB, associated with anxiety  Cardiovascular: negative  Gastrointestinal: + GERD symptoms.  Genitourinary: negative  Musculoskeletal: negative  Neurologic: negative  Psychiatric: negative  Hematologic/Lymphatic/Immunologic: swollen neck gland  Endocrine: negative          Past Medical History:     Past Medical History:   Diagnosis Date     Anxiety      Depressive disorder      Juvenile arthritis (H) 01/2004     Past Surgical History:   Procedure Laterality Date     CARDIAC SURGERY N/A 01/2004     # congenital heart disease: septal defect which was treated with a coiling procedure at age 4.  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  Activity: She walks regularly at school. Has not been exercising regularly. Used to run call cross-country in high school.         Family History:     Family History   Problem Relation Age of Onset     Cancer Maternal Grandmother      Cancer Paternal Grandmother      Cancer Paternal Grandfather      Family History: Negative with respect to inflammatory disease.       Allergies:   No Known Allergies         Medications:     Current Outpatient Medications   Medication Sig Dispense Refill     norgestimate-ethinyl estradiol (ORTHO-CYCLEN) 0.25-35 MG-MCG tablet Take 1 tablet by mouth       sertraline (ZOLOFT) 50 MG tablet Take 50 mg by mouth daily       omeprazole (PRILOSEC) 20 MG DR capsule Take 20 mg by mouth       sulfaSALAzine (AZULFIDINE) 500 MG tablet 500 mg twice daily for 1 week, then 1000 mg twice daily.              Physical Exam:   Blood pressure 101/66, pulse 79, temperature 98.4  F (36.9  C), weight 55.1 kg (121 lb 8 oz), SpO2 96 %.  Wt Readings from Last 4 Encounters:   09/29/20 55.1 kg (121 lb 8 oz)   08/25/20 52.2 kg (115 lb)       Constitutional: well-developed, appearing stated age; cooperative  Eyes: nl EOM, PERRLA, vision, conjunctiva, sclera  ENT: nl external ears, nose, hearing, lips, teeth, gums, throat  No mucous membrane lesions, normal saliva pool  Neck: no mass or thyroid enlargement  Resp: lungs clear to auscultation, nl to palpation  CV: RRR, no murmurs, rubs or gallops, no edema  GI: no ABD mass or tenderness, no HSM  MS: The TMJ, neck, shoulder, elbow,  wrist, MCP/PIP/DIP, spine, hip, knee, ankle, and foot MTP/IP joints were examined. L knee lacks 10 degrees of active flexion; there is a warm, small ballotable effusion. Otherwise, joint ROM is entirely normal.  Skin: no nail pitting, alopecia, rash, nodules or lesions  Neuro: nl cranial nerves, strength, sensation, DTRs.   Psych: nl judgement, orientation, memory, affect.         Data:     RHEUM RESULTS Latest Ref Rng & Units 11/18/2010 2/24/2011 6/10/2011   SED RATE 0 - 15 mm/h 10 10 8   AST 0 - 50 U/L - - -   ALT 0 - 50 U/L 24 24 9   ALBUMIN 3.9 - 5.1 g/dL - - -   WBC 4.0 - 11.0 10e9/L 4.3 4.4 5.4   RBC 3.7 - 5.3 10e12/L 4.22 4.53 4.06   HGB 11.7 - 15.7 g/dL 13.1 13.9 12.8   HCT 35.0 - 47.0 % 38.0 40.3 36.4   MCV 77 - 100 fl 90 89 90   MCHC 31.5 - 36.5 g/dL 34.5 34.5 35.2   RDW 10.0 - 15.0 % 13.0 13.1 12.6    - 450 10e9/L 238 229 196   CREATININE 0.39 - 0.73 mg/dL 0.59 0.64 0.56   GFR ESTIMATE, IF BLACK mL/min/1.7m2 GFR not calculated, patient <16 years old. GFR not calculated, patient <16 years old. GFR not calculated, patient <16 years old.   GFR ESTIMATE mL/min/1.7m2 GFR not calculated, patient <16 years old. GFR not calculated, patient <16 years old. GFR not calculated, patient <16 years old.       Again, thank you for allowing me to participate in the care of your patient.      Sincerely,    Quang Keith MD

## 2020-09-29 NOTE — PATIENT INSTRUCTIONS
Diagnosis:  1.  Juvenile onset inflammatory arthritis with isolated left knee inflammatory involvement.  I recommend chronic medical treatment to reduce symptoms and reduce the risk of long-term joint damage.  I recommend Humira as a first choice; methotrexate would also be an acceptable alternative.    Plan:  1.  Check blood work for previous tuberculosis exposure, hepatitis.  2.  Humira 40 mg subcu every other week.  3.  Let our office know if you experience any adverse effects of using the Humira  4. Flu shot recommended before starting humira.

## 2020-10-06 NOTE — TELEPHONE ENCOUNTER
Spoke to Shabnam and she says she is going to call the lab and get the labs done on Thursday.    Jessica Anderson MSN, RN  Rheumatology RN Care Coordinator  [facility]

## 2020-10-09 DIAGNOSIS — M08.90 CHRONIC ARTHRITIS OF JUVENILE ONSET (H): ICD-10-CM

## 2020-10-09 LAB
ALBUMIN SERPL-MCNC: 3.6 G/DL (ref 3.4–5)
ALT SERPL W P-5'-P-CCNC: 21 U/L (ref 0–50)
AST SERPL W P-5'-P-CCNC: 25 U/L (ref 0–45)
CREAT SERPL-MCNC: 0.77 MG/DL (ref 0.52–1.04)
ERYTHROCYTE [DISTWIDTH] IN BLOOD BY AUTOMATED COUNT: 12.3 % (ref 10–15)
GFR SERPL CREATININE-BSD FRML MDRD: >90 ML/MIN/{1.73_M2}
HCT VFR BLD AUTO: 38 % (ref 35–47)
HGB BLD-MCNC: 12.6 G/DL (ref 11.7–15.7)
MCH RBC QN AUTO: 30.3 PG (ref 26.5–33)
MCHC RBC AUTO-ENTMCNC: 33.2 G/DL (ref 31.5–36.5)
MCV RBC AUTO: 91 FL (ref 78–100)
PLATELET # BLD AUTO: 205 10E9/L (ref 150–450)
RBC # BLD AUTO: 4.16 10E12/L (ref 3.8–5.2)
WBC # BLD AUTO: 4.7 10E9/L (ref 4–11)

## 2020-10-09 PROCEDURE — 86803 HEPATITIS C AB TEST: CPT | Mod: 90 | Performed by: PATHOLOGY

## 2020-10-09 PROCEDURE — 84450 TRANSFERASE (AST) (SGOT): CPT | Performed by: PATHOLOGY

## 2020-10-09 PROCEDURE — 87340 HEPATITIS B SURFACE AG IA: CPT | Mod: 90 | Performed by: PATHOLOGY

## 2020-10-09 PROCEDURE — 99000 SPECIMEN HANDLING OFFICE-LAB: CPT | Performed by: PATHOLOGY

## 2020-10-09 PROCEDURE — 86481 TB AG RESPONSE T-CELL SUSP: CPT | Mod: 90 | Performed by: PATHOLOGY

## 2020-10-09 PROCEDURE — 82040 ASSAY OF SERUM ALBUMIN: CPT | Performed by: PATHOLOGY

## 2020-10-09 PROCEDURE — 82565 ASSAY OF CREATININE: CPT | Performed by: PATHOLOGY

## 2020-10-09 PROCEDURE — 84460 ALANINE AMINO (ALT) (SGPT): CPT | Performed by: PATHOLOGY

## 2020-10-09 PROCEDURE — 85027 COMPLETE CBC AUTOMATED: CPT | Performed by: PATHOLOGY

## 2020-10-12 LAB
GAMMA INTERFERON BACKGROUND BLD IA-ACNC: 0.06 IU/ML
HBV SURFACE AG SERPL QL IA: NONREACTIVE
HCV AB SERPL QL IA: NONREACTIVE
M TB IFN-G CD4+ BCKGRND COR BLD-ACNC: 9.94 IU/ML
M TB TUBERC IFN-G BLD QL: NEGATIVE
MITOGEN IGNF BCKGRD COR BLD-ACNC: 0 IU/ML
MITOGEN IGNF BCKGRD COR BLD-ACNC: 0 IU/ML

## 2020-10-13 NOTE — TELEPHONE ENCOUNTER
Patient's insurance changed, submitted new PA thru [facility] [employer]    PA Initiation    Medication: HUMIRA   Insurance Company: [employer] ([facility]) - Phone [phone] Fax [phone]  Pharmacy Filling the Rx: [facility] - [place], [place] - [address]  Filling Pharmacy Phone:    Filling Pharmacy Fax:    Start Date: 10/13/2020

## 2020-10-13 NOTE — TELEPHONE ENCOUNTER
PA Initiation    Medication: HUMIRA   Insurance Company: [facility] - Phone [phone] Fax [phone]  Pharmacy Filling the Rx: [facility] - [place], [place] - [facility] [address]  Filling Pharmacy Phone:    Filling Pharmacy Fax:    Start Date: 10/13/2020    LORAINE BACK De La Fuente: [id number]

## 2020-10-14 NOTE — TELEPHONE ENCOUNTER
Prior Authorization Approval    Authorization Effective Date: 10/14/2020  Authorization Expiration Date: 10/13/2021  Medication: HUMIRA - Approved   Approved Dose/Quantity:  2 for 28 days   Reference #:     Insurance Company: [employer] ([facility]) - Phone [phone] Fax [phone]  Expected CoPay:       CoPay Card Available:      Foundation Assistance Needed:    Which Pharmacy is filling the prescription (Not needed for infusion/clinic administered): [facility] - [place], [place] - [address]  Pharmacy Notified: Yes  Patient Notified: Yes

## 2020-10-23 ENCOUNTER — TELEPHONE (OUTPATIENT)
Dept: RHEUMATOLOGY | Facility: CLINIC | Age: 20
End: 2020-10-23

## 2020-10-23 NOTE — TELEPHONE ENCOUNTER
Patient called looking for explanation on copay card and insurance - explained processes of if and when copay card runs out of money options that we have - patient then asked me to reach out to her mother, Lili, and explain the same information.  Mothers's phone number is [phone].  Called mother and explained the same information

## 2020-11-07 ENCOUNTER — HEALTH MAINTENANCE LETTER (OUTPATIENT)
Age: 20
End: 2020-11-07

## 2020-11-19 ENCOUNTER — TELEPHONE (OUTPATIENT)
Dept: RHEUMATOLOGY | Facility: CLINIC | Age: 20
End: 2020-11-19

## 2020-11-19 ENCOUNTER — APPOINTMENT (OUTPATIENT)
Dept: TRANSPLANT | Facility: CLINIC | Age: 20
End: 2020-11-19
Attending: INTERNAL MEDICINE
Payer: COMMERCIAL

## 2020-11-19 NOTE — TELEPHONE ENCOUNTER
Left message for Shabnam to return my call in order to do the video injection education/ teaching.    Jessica Anderson MSN, RN  Rheumatology RN Care Coordinator  [facility]

## 2020-11-19 NOTE — TELEPHONE ENCOUNTER
Relevant Diagnosis:  Chronic arthritis of juvenile onsite    Teaching Topic:  Self injection of Humira    Person(s) involved in teaching:  Patient    Motivation Level:   Asks Questions:   Yes  Eager to Learn:  Yes  Cooperative:  Yes  Receptive (willing/able to accept information):  Yes  Comments:     Patient demonstrates understanding of the following:  Reason for the appointment, diagnosis and treatment plan:  Yes  Knowledge of proper use of medications and conditions for which they are ordered (with special attention to potential side effects or drug interactions):  Yes  Which situations necessitate calling provider and whom to contact:  Yes (signs of infection, temp > 101, recurrent bouts of illness or infection or if patient has been put on an antibiotic)    Teaching Concerns:  No.  Patient completed self injection of Humira using good technique with minimal coaching using her own supply of medication.     Proper use and care of Pens:  Yes and Sharps container disposal:  Yes  Nutritional needs and diet plan:  N/A  Pain management techniques:  Yes  Patient instructed on hand hygiene:  Yes  How and/when to access community resources:  Yes      Infection Prevention:  Patient demonstrates understanding of the following:  Signs and symptoms of infection taught:  Yes      Instructional Materials Used/Given:     Time spent teaching with patient:  Spent 30 minutes with patient teaching proper technique and observation of patient performing self injection. Answered all of the patient s questions to his satisfaction.    Jessica Anderson MSN, RN  Rheumatology RN Care Coordinator  [facility]

## 2020-12-28 NOTE — PROGRESS NOTES
Rheumatology Clinic     Shabnam Yu MRN# [id number]   YOB: 2000 Age: 20 year old     Date of Visit: 12/29/2020  Primary care provider: Bebe Bartlett          Assessment and Plan:     # Recurrent oligoarticular inflammatory arthritis:   Patient relates waxing and waning left knee stiffness and pain, nearly continuous for the last 3 weeks.  Examination shows small, warm effusion on the left knee.  There is painful flexion beyond 130 degrees, but the knee shows full extension.  Laboratory evaluation on October 9, 2020 showed creatinine, LFTs, and CBC all normal.    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 Reduced frequency and severity of left knee swelling, stiffness, and discomfort has been associated with use of Humira for the past 3 months.  I think that Humira has been beneficial for inflammatory arthritis.  I recommend continuing it.    Plan:  1.  Continue Humira 40 mg subcutaneously every other week.  2.  Monitor for dosing cycle-dependent symptoms that may occur in the last 1 or 2 days prior to Humira dosing.  If knee discomfort increases during that time, sparing use of ibuprofen 400 to 600 mg up to 3 times daily as needed may help decrease symptom intensity.  3. Continue off sulfasalazine    Could add back sulfasalazine or methotrexate prn worsening arthritis.    RTC 4 months.    Quang Keith MD  Staff Rheumatologist, [facility]            Active Problem List:     Patient Active Problem List    Diagnosis Date Noted     AYDEN (juvenile idiopathic arthritis) (H) 07/14/2014     Priority: Medium     Myopia 07/14/2014     Priority: Medium            History of Present Illness:   Shabnam Yu follows up for juvenile rheumatoid arthritis. She was last seen on 9-.  Trial of Humira for inflammatory joint symptoms was begun at that time.    Interval history 12-:    She has tolerated humira well. No injection site reactions.    In  the past 1 week, she noted some swelling in the left knee. Amount of swelling is less than experienced in summer 2020. Walking has not been impaired; there is minimal pain.    She feels that overall the humira has reduced frequency and severity of former L knee discomfort that occurred after prolonged weight-bearing.    She has not been taking any pain or anti-inflammatory medication recently. She formerly had acid reflux from naproxen or ibuprofen.    Interval history 09-:    In eraly August, she had an episode of marked swelling and reduced range of motion in the L knee. Since then, she has noted continuous discomfort, waxing and waning, with morning stiffness.  She has not used ibuprofen because she had significant GERD symptoms. Alleve has been well-tolerated.    Interval hx 9-:  At the age of 4 patient developed problems with swelling in her knees and hands. Her parents noted alteration in her knee anatomy during soccer practice. She ultimately was diagnosed as having juvenile rheumatoid arthritis with many involved joints (fingers, toes, and knees) and saw Dr. MORALES Silva, a pediatric rheumatologist at [facility]. She was treated with methotrexate and at the age of 11 went into remission. At age 12, she had stopped medication, but her left knee remained swollen. She had an injection in the left knee.    Since then, she reports several instances of swelling and pain in her knee. Those were short lived. In late 2019 she began having recurrent difficulties with pain and swelling in the left knee.  She saw Dr. Mckenzie in rheumatology at [facility].  Flare of inflammatory arthritis in the left knee was diagnosed, and the knee was aspirated and injected.  She saw Dr. Mckenzie in follow-up virtually on Patito 3, 2020.  At that time excellent and durable response to   left knee injections was reported, but the left knee had again become swollen, stiff, and painful.  At that  "point, the plan was to use scheduled naproxen and to start sulfasalazine.  In July, she followed up with Dr. Mckenzie, and reported poor tolerance of naproxen and intermittent continued left knee swelling and pain.  Dr. Mckenzie recommended advancing the dose of sulfasalazine.    On July 11, the L knee again swelling, and was aspirated and injected again. All the swelling has since disappeared. She does not have pain on a daily basis. She has been hiking and canoeing without difficulty. Somestimes, the left knee feels \"off\" compared to the R, but no swelling/loss of ROM. No redness/warmth.    She has had no problems with pain or swelling in joints elsewhere recently with the exception of pain in the right TMJ region.    In the recent past she has been using 600 mg twice daily of ibuprofen. She developed stomach upset and chest pain. She stopped it and that has improved. Sulfasalazine has also been difficult to take because of heartburn, and she has taken it only occasionally at reduced doses since visiting with Dr. Mckenzie.    She has been under increasing stress at school. She has had no history of psoriasis or photosensitivity. There is no history of iritis or uveitis, dry eyes or dry mouth, she has had no problems with pleurisy or asthma. There is no history of stomach ulcers or inflammatory bowel disease. She has had no problems with kidney or liver disease, diabetes or thyroid disease, cytopenias or neurologic disease. There is no history of hypertension.            Review of Systems:       Constitutional: occasional low-grade fevers, no chills/sweats.  Skin: small red bumps on the backs of her fingers.  Eyes: no eye pain or vision change  Ears/Nose/Throat: negative  Respiratory: she has SOB, associated with anxiety  Cardiovascular: negative  Gastrointestinal: + GERD symptoms.  Genitourinary: negative  Musculoskeletal: negative  Neurologic: negative  Psychiatric: " negative  Hematologic/Lymphatic/Immunologic: swollen neck gland  Endocrine: negative          Past Medical History:     Past Medical History:   Diagnosis Date     Anxiety      Depressive disorder      Juvenile arthritis (H) 01/2004     Past Surgical History:   Procedure Laterality Date     CARDIAC SURGERY N/A 01/2004     # congenital heart disease: septal defect which was treated with a coiling procedure at age 4.  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  Activity: She walks regularly at school. Has not been exercising regularly. Used to run call cross-country in high school.         Family History:     Family History   Problem Relation Age of Onset     Cancer Maternal Grandmother      Cancer Paternal Grandmother      Cancer Paternal Grandfather      Family History: Negative with respect to inflammatory disease.       Allergies:   No Known Allergies         Medications:     Current Outpatient Medications   Medication Sig Dispense Refill     adalimumab (HUMIRA *CF*) 40 MG/0.4ML pen kit Inject 0.4 mLs (40 mg) Subcutaneous every 14 days Hold for signs of infection, then seek medical attention. 1 each 5     norgestimate-ethinyl estradiol (ORTHO-CYCLEN) 0.25-35 MG-MCG tablet Take 1 tablet by mouth       omeprazole (PRILOSEC) 20 MG DR capsule Take 20 mg by mouth       sertraline (ZOLOFT) 50 MG tablet Take 50 mg by mouth daily       sulfaSALAzine (AZULFIDINE) 500 MG tablet 500 mg twice daily for 1 week, then 1000 mg twice daily.              Physical Exam:   There  "were no vitals taken for this visit.  Wt Readings from Last 4 Encounters:   09/29/20 55.1 kg (121 lb 8 oz)   08/25/20 52.2 kg (115 lb)       Constitutional: well-developed, appearing stated age; cooperative  Eyes: nl EOM, PERRLA, vision, conjunctiva, sclera  ENT: nl external ears, nose, hearing, lips, teeth, gums, throat  No mucous membrane lesions, normal saliva pool  Neck: no mass or thyroid enlargement  Resp: lungs clear to auscultation, nl to palpation  CV: RRR, no murmurs, rubs or gallops, no edema  GI: no ABD mass or tenderness, no HSM  MS: The TMJ, neck, shoulder, elbow, wrist, MCP/PIP/DIP examined. L knee shows scant visible effusion. No altered knee ROM.  Skin: no nail pitting, alopecia, rash, nodules or lesions  Neuro: nl cranial nerves, strength, sensation, DTRs.   Psych: nl judgement, orientation, memory, affect.         Data:     RHEUM RESULTS Latest Ref Rng & Units 2/24/2011 6/10/2011 10/9/2020   SED RATE 0 - 15 mm/h 10 8 -   AST 0 - 45 U/L - - 25   ALT 0 - 50 U/L 24 9 21   ALBUMIN 3.4 - 5.0 g/dL - - 3.6   WBC 4.0 - 11.0 10e9/L 4.4 5.4 4.7   RBC 3.8 - 5.2 10e12/L 4.53 4.06 4.16   HGB 11.7 - 15.7 g/dL 13.9 12.8 12.6   HCT 35.0 - 47.0 % 40.3 36.4 38.0   MCV 78 - 100 fl 89 90 91   MCHC 31.5 - 36.5 g/dL 34.5 35.2 33.2   RDW 10.0 - 15.0 % 13.1 12.6 12.3    - 450 10e9/L 229 196 205   CREATININE 0.52 - 1.04 mg/dL 0.64 0.56 0.77   GFR ESTIMATE, IF BLACK >60 mL/min/[1.73:m2] GFR not calculated, patient <16 years old. GFR not calculated, patient <16 years old. >90   GFR ESTIMATE >60 mL/min/[1.73:m2] GFR not calculated, patient <16 years old. GFR not calculated, patient <16 years old. >90   HEPATITIS C ANTIBODY NR:Nonreactive - - Nonreactive       Shabnam Yu is a 20 year old female who is being evaluated via a billable video visit.      The patient has been notified of following:     \"This video visit will be conducted via a call between you and your physician/provider. We have found that certain " "health care needs can be provided without the need for an in-person physical exam.  This service lets us provide the care you need with a video conversation.  If a prescription is necessary we can send it directly to your pharmacy.  If lab work is needed we can place an order for that and you can then stop by our lab to have the test done at a later time.    Video visits are billed at different rates depending on your insurance coverage.  Please reach out to your insurance provider with any questions.    If during the course of the call the physician/provider feels a video visit is not appropriate, you will not be charged for this service.\"    Patient has given verbal consent for Video visit? Yes  How would you like to obtain your AVS? MyChart    Will anyone else be joining your video visit? No        Video-Visit Details    Type of service:  Video Visit    Video Start Time: 11:34 AM  Video End Time: 11:59 AM    Originating Location (pt. Location): Home    Distant Location (provider location):  [facility] [place]     Platform used for Video Visit: Arvin Keith MD        "

## 2020-12-29 ENCOUNTER — VIRTUAL VISIT (OUTPATIENT)
Dept: RHEUMATOLOGY | Facility: CLINIC | Age: 20
End: 2020-12-29
Attending: INTERNAL MEDICINE
Payer: COMMERCIAL

## 2020-12-29 DIAGNOSIS — M08.90 CHRONIC ARTHRITIS OF JUVENILE ONSET (H): Primary | ICD-10-CM

## 2020-12-29 PROCEDURE — 99213 OFFICE O/P EST LOW 20 MIN: CPT | Mod: 95 | Performed by: INTERNAL MEDICINE

## 2020-12-29 NOTE — PATIENT INSTRUCTIONS
Diagnosis:  1.  Juvenile inflammatory arthritis: Reduced frequency and severity of left knee swelling, stiffness, and discomfort has been associated with use of Humira for the past 3 months.  I think that Humira has been beneficial for inflammatory arthritis.  I recommend continuing it.    Plan:  1.  Continue Humira 40 mg subcutaneously every other week.  2.  Monitor for dosing cycle-dependent symptoms that may occur in the last 1 or 2 days prior to Humira dosing.  If knee discomfort increases during that time, sparing use of ibuprofen 400 to 600 mg up to 3 times daily as needed may help decrease symptom intensity.

## 2020-12-29 NOTE — LETTER
12/29/2020       RE: Shabnam Yu  [address] [facility] [address]     Dear Colleague,    Thank you for referring your patient, Shabnam Yu, to the [facility] RHEUMATOLOGY CLINIC [place] at [facility]. Please see a copy of my visit note below.    Rheumatology Clinic     Shabnam Yu MRN# [id number]   YOB: 2000 Age: 20 year old     Date of Visit: 12/29/2020  Primary care provider: Bebe Bartlett          Assessment and Plan:     # Recurrent oligoarticular inflammatory arthritis:   Patient relates waxing and waning left knee stiffness and pain, nearly continuous for the last 3 weeks.  Examination shows small, warm effusion on the left knee.  There is painful flexion beyond 130 degrees, but the knee shows full extension.  Laboratory evaluation on October 9, 2020 showed creatinine, LFTs, and CBC all normal.    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 Reduced frequency and severity of left knee swelling, stiffness, and discomfort has been associated with use of Humira for the past 3 months.  I think that Humira has been beneficial for inflammatory arthritis.  I recommend continuing it.    Plan:  1.  Continue Humira 40 mg subcutaneously every other week.  2.  Monitor for dosing cycle-dependent symptoms that may occur in the last 1 or 2 days prior to Humira dosing.  If knee discomfort increases during that time, sparing use of ibuprofen 400 to 600 mg up to 3 times daily as needed may help decrease symptom intensity.  3. Continue off sulfasalazine    Could add back sulfasalazine or methotrexate prn worsening arthritis.    RTC 4 months.    Quang Keith MD  Staff Rheumatologist, [facility]            Active Problem List:     Patient Active Problem List    Diagnosis Date Noted     AYDEN (juvenile idiopathic arthritis) (H) 07/14/2014     Priority: Medium     Myopia 07/14/2014     Priority:  Medium            History of Present Illness:   Shabnam Yu follows up for juvenile rheumatoid arthritis. She was last seen on 9-.  Trial of Humira for inflammatory joint symptoms was begun at that time.    Interval history 12-:    She has tolerated humira well. No injection site reactions.    In the past 1 week, she noted some swelling in the left knee. Amount of swelling is less than experienced in summer 2020. Walking has not been impaired; there is minimal pain.    She feels that overall the humira has reduced frequency and severity of former L knee discomfort that occurred after prolonged weight-bearing.    She has not been taking any pain or anti-inflammatory medication recently. She formerly had acid reflux from naproxen or ibuprofen.    Interval history 09-:    In eraly August, she had an episode of marked swelling and reduced range of motion in the L knee. Since then, she has noted continuous discomfort, waxing and waning, with morning stiffness.  She has not used ibuprofen because she had significant GERD symptoms. Alleve has been well-tolerated.    Interval hx 9-:  At the age of 4 patient developed problems with swelling in her knees and hands. Her parents noted alteration in her knee anatomy during soccer practice. She ultimately was diagnosed as having juvenile rheumatoid arthritis with many involved joints (fingers, toes, and knees) and saw Dr. MORALES Silva, a pediatric rheumatologist at [facility]. She was treated with methotrexate and at the age of 11 went into remission. At age 12, she had stopped medication, but her left knee remained swollen. She had an injection in the left knee.    Since then, she reports several instances of swelling and pain in her knee. Those were short lived. In late 2019 she began having recurrent difficulties with pain and swelling in the left knee.  She saw Dr. Mckenzie in rheumatology at [facility].  Flare of  "inflammatory arthritis in the left knee was diagnosed, and the knee was aspirated and injected.  She saw Dr. Mckenzie in follow-up virtually on Patito 3, 2020.  At that time excellent and durable response to   left knee injections was reported, but the left knee had again become swollen, stiff, and painful.  At that point, the plan was to use scheduled naproxen and to start sulfasalazine.  In July, she followed up with Dr. Mckenzie, and reported poor tolerance of naproxen and intermittent continued left knee swelling and pain.  Dr. Mckenzie recommended advancing the dose of sulfasalazine.    On July 11, the L knee again swelling, and was aspirated and injected again. All the swelling has since disappeared. She does not have pain on a daily basis. She has been hiking and canoeing without difficulty. Somestimes, the left knee feels \"off\" compared to the R, but no swelling/loss of ROM. No redness/warmth.    She has had no problems with pain or swelling in joints elsewhere recently with the exception of pain in the right TMJ region.    In the recent past she has been using 600 mg twice daily of ibuprofen. She developed stomach upset and chest pain. She stopped it and that has improved. Sulfasalazine has also been difficult to take because of heartburn, and she has taken it only occasionally at reduced doses since visiting with Dr. Mckenzie.    She has been under increasing stress at school. She has had no history of psoriasis or photosensitivity. There is no history of iritis or uveitis, dry eyes or dry mouth, she has had no problems with pleurisy or asthma. There is no history of stomach ulcers or inflammatory bowel disease. She has had no problems with kidney or liver disease, diabetes or thyroid disease, cytopenias or neurologic disease. There is no history of hypertension.            Review of Systems:       Constitutional: occasional low-grade fevers, no chills/sweats.  Skin: small red bumps on the " backs of her fingers.  Eyes: no eye pain or vision change  Ears/Nose/Throat: negative  Respiratory: she has SOB, associated with anxiety  Cardiovascular: negative  Gastrointestinal: + GERD symptoms.  Genitourinary: negative  Musculoskeletal: negative  Neurologic: negative  Psychiatric: negative  Hematologic/Lymphatic/Immunologic: swollen neck gland  Endocrine: negative          Past Medical History:     Past Medical History:   Diagnosis Date     Anxiety      Depressive disorder      Juvenile arthritis (H) 01/2004     Past Surgical History:   Procedure Laterality Date     CARDIAC SURGERY N/A 01/2004     # congenital heart disease: septal defect which was treated with a coiling procedure at age 4.  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  Activity: She walks regularly at school. Has not been exercising regularly. Used to run call cross-country in high school.         Family History:     Family History   Problem Relation Age of Onset     Cancer Maternal Grandmother      Cancer Paternal Grandmother      Cancer Paternal Grandfather      Family History: Negative with respect to inflammatory disease.       Allergies:   No Known Allergies         Medications:     Current Outpatient Medications   Medication Sig Dispense Refill     adalimumab (HUMIRA *CF*) 40 MG/0.4ML pen kit Inject 0.4 mLs (40 mg) Subcutaneous every 14 days Hold for signs of infection, then seek medical attention. 1 each 5     norgestimate-ethinyl estradiol (ORTHO-CYCLEN) 0.25-35 MG-MCG tablet Take 1  tablet by mouth       omeprazole (PRILOSEC) 20 MG DR capsule Take 20 mg by mouth       sertraline (ZOLOFT) 50 MG tablet Take 50 mg by mouth daily       sulfaSALAzine (AZULFIDINE) 500 MG tablet 500 mg twice daily for 1 week, then 1000 mg twice daily.              Physical Exam:   There were no vitals taken for this visit.  Wt Readings from Last 4 Encounters:   09/29/20 55.1 kg (121 lb 8 oz)   08/25/20 52.2 kg (115 lb)       Constitutional: well-developed, appearing stated age; cooperative  Eyes: nl EOM, PERRLA, vision, conjunctiva, sclera  ENT: nl external ears, nose, hearing, lips, teeth, gums, throat  No mucous membrane lesions, normal saliva pool  Neck: no mass or thyroid enlargement  Resp: lungs clear to auscultation, nl to palpation  CV: RRR, no murmurs, rubs or gallops, no edema  GI: no ABD mass or tenderness, no HSM  MS: The TMJ, neck, shoulder, elbow, wrist, MCP/PIP/DIP examined. L knee shows scant visible effusion. No altered knee ROM.  Skin: no nail pitting, alopecia, rash, nodules or lesions  Neuro: nl cranial nerves, strength, sensation, DTRs.   Psych: nl judgement, orientation, memory, affect.         Data:     RHEUM RESULTS Latest Ref Rng & Units 2/24/2011 6/10/2011 10/9/2020   SED RATE 0 - 15 mm/h 10 8 -   AST 0 - 45 U/L - - 25   ALT 0 - 50 U/L 24 9 21   ALBUMIN 3.4 - 5.0 g/dL - - 3.6   WBC 4.0 - 11.0 10e9/L 4.4 5.4 4.7   RBC 3.8 - 5.2 10e12/L 4.53 4.06 4.16   HGB 11.7 - 15.7 g/dL 13.9 12.8 12.6   HCT 35.0 - 47.0 % 40.3 36.4 38.0   MCV 78 - 100 fl 89 90 91   MCHC 31.5 - 36.5 g/dL 34.5 35.2 33.2   RDW 10.0 - 15.0 % 13.1 12.6 12.3    - 450 10e9/L 229 196 205   CREATININE 0.52 - 1.04 mg/dL 0.64 0.56 0.77   GFR ESTIMATE, IF BLACK >60 mL/min/[1.73:m2] GFR not calculated, patient <16 years old. GFR not calculated, patient <16 years old. >90   GFR ESTIMATE >60 mL/min/[1.73:m2] GFR not calculated, patient <16 years old. GFR not calculated, patient <16 years old. >90   HEPATITIS C ANTIBODY NR:Nonreactive  "- - Nonreactive       Shabnam Yu is a 20 year old female who is being evaluated via a billable video visit.      The patient has been notified of following:     \"This video visit will be conducted via a call between you and your physician/provider. We have found that certain health care needs can be provided without the need for an in-person physical exam.  This service lets us provide the care you need with a video conversation.  If a prescription is necessary we can send it directly to your pharmacy.  If lab work is needed we can place an order for that and you can then stop by our lab to have the test done at a later time.    Video visits are billed at different rates depending on your insurance coverage.  Please reach out to your insurance provider with any questions.    If during the course of the call the physician/provider feels a video visit is not appropriate, you will not be charged for this service.\"    Patient has given verbal consent for Video visit? Yes  How would you like to obtain your AVS? MyChart    Will anyone else be joining your video visit? No        Video-Visit Details    Type of service:  Video Visit    Video Start Time: 11:34 AM  Video End Time: 11:59 AM    Originating Location (pt. Location): Home    Distant Location (provider location):  [facility] RHEUMATOLOGY CLINIC [place]     Platform used for Video Visit: Arvin Keith MD          "

## 2021-01-26 ENCOUNTER — MYC MEDICAL ADVICE (OUTPATIENT)
Dept: RHEUMATOLOGY | Facility: CLINIC | Age: 21
End: 2021-01-26

## 2021-01-28 NOTE — TELEPHONE ENCOUNTER
I recommend getting the coronavirus vaccination. It is safe and it works. There are no data suggesting that people with rheumatic disease carry any higher risk of adverse effects above the low rate observed with general population.

## 2021-04-27 ENCOUNTER — OFFICE VISIT (OUTPATIENT)
Dept: RHEUMATOLOGY | Facility: CLINIC | Age: 21
End: 2021-04-27
Attending: INTERNAL MEDICINE
Payer: COMMERCIAL

## 2021-04-27 VITALS
OXYGEN SATURATION: 98 % | WEIGHT: 143.8 LBS | HEART RATE: 86 BPM | BODY MASS INDEX: 25.47 KG/M2 | SYSTOLIC BLOOD PRESSURE: 106 MMHG | DIASTOLIC BLOOD PRESSURE: 70 MMHG

## 2021-04-27 DIAGNOSIS — L29.9 ITCHING WITH IRRITATION: Primary | ICD-10-CM

## 2021-04-27 DIAGNOSIS — M08.90 CHRONIC ARTHRITIS OF JUVENILE ONSET (H): ICD-10-CM

## 2021-04-27 PROCEDURE — 99214 OFFICE O/P EST MOD 30 MIN: CPT | Performed by: INTERNAL MEDICINE

## 2021-04-27 PROCEDURE — G0463 HOSPITAL OUTPT CLINIC VISIT: HCPCS

## 2021-04-27 RX ORDER — DIPHENHYDRAMINE HYDROCHLORIDE, ZINC ACETATE 2; .1 G/100G; G/100G
CREAM TOPICAL 3 TIMES DAILY PRN
Qty: 28 G | Refills: 1 | Status: SHIPPED | OUTPATIENT
Start: 2021-04-27 | End: 2023-08-24

## 2021-04-27 NOTE — PATIENT INSTRUCTIONS
Diagnosis:  1.  Juvenile onset inflammatory arthritis: Reduced knee swelling, stiffness, and pain since regular use of Humira started in October 2020.  I recommend continuing every other week Humira.  2.  Prolonged injection site reactions with itching and redness: I recommend a trial of Benadryl-containing cream.  3.  Humira dosing cycle dependent knee stiffness and discomfort.  For stiffness and pain, you may intermittently use Aleve.    Plan:  1.  Continue Humira 40 mg subcutaneous every other week.  2.  Use Benadryl cream on injection site up to 4 times daily as needed.  3.  Aleve 220 or 440 mg up to twice daily as needed for knee stiffness and swelling.  4.  Check kidney function and blood counts at your convenience.

## 2021-04-27 NOTE — NURSING NOTE
Chief Complaint   Patient presents with     RECHECK     RA follow up         /70 (BP Location: Right arm, Patient Position: Sitting, Cuff Size: Adult Regular)   Pulse 86   Wt 65.2 kg (143 lb 12.8 oz)   SpO2 98%   BMI 25.47 kg/m        Juliano Gilmore, EMT

## 2021-04-27 NOTE — LETTER
4/27/2021       RE: Shabnam Yu  [address] [facility] [address]     Dear Colleague,    Thank you for referring your patient, Shabnam Yu, to the [facility] RHEUMATOLOGY CLINIC [place] at [facility]. Please see a copy of my visit note below.    Rheumatology Clinic-in person     Shabnam Yu MRN# [id number]   YOB: 2000 Age: 20 year old     Date of Visit: 04/27/2021  Primary care provider: Bebe Bartlett          Assessment and Plan:     # Recurrent oligoarticular inflammatory arthritis:   Patient reports markedly decreased former left knee swelling, pain, and stiffness.  Examination shows resolution of former left knee effusion and full painless range of motion.  Laboratory evaluation on October 9, 2020 showed creatinine, LFTs, and CBC all normal.    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 Reduced frequency and severity of left knee swelling, stiffness, and discomfort has been associated with regular use of adalimumab since fall 2020..  I think that Humira has been beneficial for inflammatory arthritis.  I recommend continuing it.    Plan:  1.  Continue Humira 40 mg subcutaneous every other week.  2.  Use Benadryl cream on humira injection site up to 4 times daily as needed.  3.  Aleve 220 or 440 mg up to twice daily as needed for knee stiffness and swelling.  4.  Check kidney function and blood counts at your convenience.  5. Continue off sulfasalazine    Consider adding back sulfasalazine or methotrexate prn worsening arthritis.    RTC 6 months.    Quang Keith MD  Staff Rheumatologist, [facility]            Active Problem List:     Patient Active Problem List    Diagnosis Date Noted     AYDEN (juvenile idiopathic arthritis) (H) 07/14/2014     Priority: Medium     Myopia 07/14/2014     Priority: Medium            History of Present Illness:   Shabnam Yu  follows up for juvenile rheumatoid arthritis. She was last seen on 12-.  Humira was continued for inflammatory arthritis.    Interval history 04-:    2nd dose of Moderna vaccine went in 3 weeks ago; she had fever, chills lasting several days.   She was seen in [place] with cold and URI symptoms a month ago.    Her joints are better with regular use of humira. Her left knee occasionally is slightly swollen, but the knee does not affect ADLs.    There are dosing cycle-associated L knee stiffness symptoms. Not using alleve or other OTC remedies for joint symptoms.  She is active socially and athletically.  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    She has some itching and redness at Humira injection site; it lasts a week.    Interval history 12-:    She has tolerated humira well. No injection site reactions.    In the past 1 week, she noted some swelling in the left knee. Amount of swelling is less than experienced in summer 2020. Walking has not been impaired; there is minimal pain.    She feels that overall the humira has reduced frequency and severity of former L knee discomfort that occurred after prolonged weight-bearing.    She has not been taking any pain or anti-inflammatory medication recently. She formerly had acid reflux from naproxen or ibuprofen.    Interval history 09-:    In eraly August, she had an episode of marked swelling and reduced range of motion in the L knee. Since then, she has noted continuous discomfort, waxing and waning, with morning stiffness.  She has not used ibuprofen because she had significant GERD symptoms. Alleve has been well-tolerated.    Interval hx 9-:  At the age of 4 patient developed problems with swelling in her knees and hands. Her parents noted alteration in her knee anatomy during soccer practice. She ultimately was diagnosed as having juvenile rheumatoid arthritis with many involved joints (fingers, toes, and knees) and saw Dr. NIELSEN  "Ricardo, a pediatric rheumatologist at [facility]. She was treated with methotrexate and at the age of 11 went into remission. At age 12, she had stopped medication, but her left knee remained swollen. She had an injection in the left knee.    Since then, she reports several instances of swelling and pain in her knee. Those were short lived. In late 2019 she began having recurrent difficulties with pain and swelling in the left knee.  She saw Dr. Mckenzie in rheumatology at [facility].  Flare of inflammatory arthritis in the left knee was diagnosed, and the knee was aspirated and injected.  She saw Dr. Mckenzie in follow-up virtually on Patito 3, 2020.  At that time excellent and durable response to   left knee injections was reported, but the left knee had again become swollen, stiff, and painful.  At that point, the plan was to use scheduled naproxen and to start sulfasalazine.  In July, she followed up with Dr. Mckenzie, and reported poor tolerance of naproxen and intermittent continued left knee swelling and pain.  Dr. Mckenzie recommended advancing the dose of sulfasalazine.    On July 11, the L knee again swelling, and was aspirated and injected again. All the swelling has since disappeared. She does not have pain on a daily basis. She has been hiking and canoeing without difficulty. Somestimes, the left knee feels \"off\" compared to the R, but no swelling/loss of ROM. No redness/warmth.    She has had no problems with pain or swelling in joints elsewhere recently with the exception of pain in the right TMJ region.    In the recent past she has been using 600 mg twice daily of ibuprofen. She developed stomach upset and chest pain. She stopped it and that has improved. Sulfasalazine has also been difficult to take because of heartburn, and she has taken it only occasionally at reduced doses since visiting with Dr. Mckenzie.    She has been under increasing stress at school. She " has had no history of psoriasis or photosensitivity. There is no history of iritis or uveitis, dry eyes or dry mouth, she has had no problems with pleurisy or asthma. There is no history of stomach ulcers or inflammatory bowel disease. She has had no problems with kidney or liver disease, diabetes or thyroid disease, cytopenias or neurologic disease. There is no history of hypertension.            Review of Systems:       Constitutional: none  Skin: neg  Eyes: no eye pain or vision change  Ears/Nose/Throat: negative  Respiratory: she has SOB, associated with anxiety  Cardiovascular: negative  Gastrointestinal: + GERD symptoms, stable  Genitourinary: negative  Musculoskeletal: negative  Neurologic: negative  Psychiatric: negative  Hematologic/Lymphatic/Immunologic: neg  Endocrine: negative          Past Medical History:     Past Medical History:   Diagnosis Date     Anxiety      Depressive disorder      Juvenile arthritis (H) 01/2004     Past Surgical History:   Procedure Laterality Date     CARDIAC SURGERY N/A 01/2004     # congenital heart disease: septal defect which was treated with a coiling procedure at age 4.  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  Activity: She walks regularly at school. Has not been exercising regularly. Used to run call cross-country in high school.         Family History:     Family History   Problem Relation Age of Onset     Cancer Maternal Grandmother      Cancer Paternal Grandmother      Cancer Paternal Grandfather      Family  History: Negative with respect to inflammatory disease.       Allergies:   No Known Allergies         Medications:     Current Outpatient Medications   Medication Sig Dispense Refill     adalimumab (HUMIRA *CF*) 40 MG/0.4ML pen kit Inject 0.4 mLs (40 mg) Subcutaneous every 14 days Hold for signs of infection, then seek medical attention. 1 each 5     norgestimate-ethinyl estradiol (ORTHO-CYCLEN) 0.25-35 MG-MCG tablet Take 1 tablet by mouth       sertraline (ZOLOFT) 50 MG tablet Take 50 mg by mouth daily       omeprazole (PRILOSEC) 20 MG DR capsule Take 20 mg by mouth       sulfaSALAzine (AZULFIDINE) 500 MG tablet 500 mg twice daily for 1 week, then 1000 mg twice daily.              Physical Exam:   Blood pressure 106/70, pulse 86, weight 65.2 kg (143 lb 12.8 oz), SpO2 98 %.  Wt Readings from Last 4 Encounters:   04/27/21 65.2 kg (143 lb 12.8 oz)   09/29/20 55.1 kg (121 lb 8 oz)   08/25/20 52.2 kg (115 lb)       Constitutional: well-developed, appearing stated age; cooperative  Eyes: nl EOM, PERRLA, vision, conjunctiva, sclera  ENT: nl external ears, nose, hearing, lips, teeth, gums, throat  No mucous membrane lesions, normal saliva pool  Neck: no mass or thyroid enlargement  Resp: lungs clear to auscultation, nl to palpation  CV: RRR, no murmurs, rubs or gallops, no edema  GI: no ABD mass or tenderness, no HSM  MS: The TMJ, neck, shoulder, elbow, wrist, MCP/PIP/DIP examined. L knee shows scant visible effusion. No warmth. No altered knee ROM.  Skin: no nail pitting, alopecia, rash, nodules or lesions  Neuro: nl cranial nerves, strength, sensation, DTRs.   Psych: nl judgement, orientation, memory, affect.         Data:     RHEUM RESULTS Latest Ref Rng & Units 2/24/2011 6/10/2011 10/9/2020   SED RATE 0 - 15 mm/h 10 8 -   AST 0 - 45 U/L - - 25   ALT 0 - 50 U/L 24 9 21   ALBUMIN 3.4 - 5.0 g/dL - - 3.6   WBC 4.0 - 11.0 10e9/L 4.4 5.4 4.7   RBC 3.8 - 5.2 10e12/L 4.53 4.06 4.16   HGB 11.7 - 15.7 g/dL 13.9 12.8 12.6   HCT  35.0 - 47.0 % 40.3 36.4 38.0   MCV 78 - 100 fl 89 90 91   MCHC 31.5 - 36.5 g/dL 34.5 35.2 33.2   RDW 10.0 - 15.0 % 13.1 12.6 12.3    - 450 10e9/L 229 196 205   CREATININE 0.52 - 1.04 mg/dL 0.64 0.56 0.77   GFR ESTIMATE, IF BLACK >60 mL/min/[1.73:m2] GFR not calculated, patient <16 years old. GFR not calculated, patient <16 years old. >90   GFR ESTIMATE >60 mL/min/[1.73:m2] GFR not calculated, patient <16 years old. GFR not calculated, patient <16 years old. >90   HEPATITIS C ANTIBODY NR:Nonreactive - - Nonreactive           Again, thank you for allowing me to participate in the care of your patient.      Sincerely,    Quang Keith MD

## 2021-04-27 NOTE — PROGRESS NOTES
Rheumatology Clinic-in person     Shabnam Yu MRN# [id number]   YOB: 2000 Age: 20 year old     Date of Visit: 04/27/2021  Primary care provider: Bebe Bartlett          Assessment and Plan:     # Recurrent oligoarticular inflammatory arthritis:   Patient reports markedly decreased former left knee swelling, pain, and stiffness.  Examination shows resolution of former left knee effusion and full painless range of motion.  Laboratory evaluation on October 9, 2020 showed creatinine, LFTs, and CBC all normal.    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 Reduced frequency and severity of left knee swelling, stiffness, and discomfort has been associated with regular use of adalimumab since fall 2020..  I think that Humira has been beneficial for inflammatory arthritis.  I recommend continuing it.    Plan:  1.  Continue Humira 40 mg subcutaneous every other week.  2.  Use Benadryl cream on humira injection site up to 4 times daily as needed.  3.  Aleve 220 or 440 mg up to twice daily as needed for knee stiffness and swelling.  4.  Check kidney function and blood counts at your convenience.  5. Continue off sulfasalazine    Consider adding back sulfasalazine or methotrexate prn worsening arthritis.    RTC 6 months.    Quang Keith MD  Staff Rheumatologist, [facility]            Active Problem List:     Patient Active Problem List    Diagnosis Date Noted     AYDEN (juvenile idiopathic arthritis) (H) 07/14/2014     Priority: Medium     Myopia 07/14/2014     Priority: Medium            History of Present Illness:   Shabnam Yu follows up for juvenile rheumatoid arthritis. She was last seen on 12-.  Humira was continued for inflammatory arthritis.    Interval history 04-:    2nd dose of Moderna vaccine went in 3 weeks ago; she had fever, chills lasting several days.   She was seen in [place] with cold and URI symptoms a month ago.    Her  joints are better with regular use of humira. Her left knee occasionally is slightly swollen, but the knee does not affect ADLs.    There are dosing cycle-associated L knee stiffness symptoms. Not using alleve or other OTC remedies for joint symptoms.  She is active socially and athletically.  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    She has some itching and redness at Humira injection site; it lasts a week.    Interval history 12-:    She has tolerated humira well. No injection site reactions.    In the past 1 week, she noted some swelling in the left knee. Amount of swelling is less than experienced in summer 2020. Walking has not been impaired; there is minimal pain.    She feels that overall the humira has reduced frequency and severity of former L knee discomfort that occurred after prolonged weight-bearing.    She has not been taking any pain or anti-inflammatory medication recently. She formerly had acid reflux from naproxen or ibuprofen.    Interval history 09-:    In eraly August, she had an episode of marked swelling and reduced range of motion in the L knee. Since then, she has noted continuous discomfort, waxing and waning, with morning stiffness.  She has not used ibuprofen because she had significant GERD symptoms. Alleve has been well-tolerated.    Interval hx 9-:  At the age of 4 patient developed problems with swelling in her knees and hands. Her parents noted alteration in her knee anatomy during soccer practice. She ultimately was diagnosed as having juvenile rheumatoid arthritis with many involved joints (fingers, toes, and knees) and saw Dr. MORALES Silva, a pediatric rheumatologist at [facility]. She was treated with methotrexate and at the age of 11 went into remission. At age 12, she had stopped medication, but her left knee remained swollen. She had an injection in the left knee.    Since then, she reports several instances of swelling and pain in her  "knee. Those were short lived. In late 2019 she began having recurrent difficulties with pain and swelling in the left knee.  She saw Dr. Mckenzie in rheumatology at [facility].  Flare of inflammatory arthritis in the left knee was diagnosed, and the knee was aspirated and injected.  She saw Dr. Mckenzie in follow-up virtually on Patito 3, 2020.  At that time excellent and durable response to   left knee injections was reported, but the left knee had again become swollen, stiff, and painful.  At that point, the plan was to use scheduled naproxen and to start sulfasalazine.  In July, she followed up with Dr. Mckenzie, and reported poor tolerance of naproxen and intermittent continued left knee swelling and pain.  Dr. Mckenzie recommended advancing the dose of sulfasalazine.    On July 11, the L knee again swelling, and was aspirated and injected again. All the swelling has since disappeared. She does not have pain on a daily basis. She has been hiking and canoeing without difficulty. Somestimes, the left knee feels \"off\" compared to the R, but no swelling/loss of ROM. No redness/warmth.    She has had no problems with pain or swelling in joints elsewhere recently with the exception of pain in the right TMJ region.    In the recent past she has been using 600 mg twice daily of ibuprofen. She developed stomach upset and chest pain. She stopped it and that has improved. Sulfasalazine has also been difficult to take because of heartburn, and she has taken it only occasionally at reduced doses since visiting with Dr. Mckenzie.    She has been under increasing stress at school. She has had no history of psoriasis or photosensitivity. There is no history of iritis or uveitis, dry eyes or dry mouth, she has had no problems with pleurisy or asthma. There is no history of stomach ulcers or inflammatory bowel disease. She has had no problems with kidney or liver disease, diabetes or thyroid disease, cytopenias " or neurologic disease. There is no history of hypertension.            Review of Systems:       Constitutional: none  Skin: neg  Eyes: no eye pain or vision change  Ears/Nose/Throat: negative  Respiratory: she has SOB, associated with anxiety  Cardiovascular: negative  Gastrointestinal: + GERD symptoms, stable  Genitourinary: negative  Musculoskeletal: negative  Neurologic: negative  Psychiatric: negative  Hematologic/Lymphatic/Immunologic: neg  Endocrine: negative          Past Medical History:     Past Medical History:   Diagnosis Date     Anxiety      Depressive disorder      Juvenile arthritis (H) 01/2004     Past Surgical History:   Procedure Laterality Date     CARDIAC SURGERY N/A 01/2004     # congenital heart disease: septal defect which was treated with a coiling procedure at age 4.  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  Activity: She walks regularly at school. Has not been exercising regularly. Used to run call cross-country in high school.         Family History:     Family History   Problem Relation Age of Onset     Cancer Maternal Grandmother      Cancer Paternal Grandmother      Cancer Paternal Grandfather      Family History: Negative with respect to inflammatory disease.       Allergies:   No Known Allergies         Medications:     Current Outpatient Medications   Medication Sig Dispense Refill     adalimumab (HUMIRA *CF*) 40 MG/0.4ML pen kit Inject 0.4 mLs (40 mg) Subcutaneous every 14 days Hold for signs of infection, then seek medical  attention. 1 each 5     norgestimate-ethinyl estradiol (ORTHO-CYCLEN) 0.25-35 MG-MCG tablet Take 1 tablet by mouth       sertraline (ZOLOFT) 50 MG tablet Take 50 mg by mouth daily       omeprazole (PRILOSEC) 20 MG DR capsule Take 20 mg by mouth       sulfaSALAzine (AZULFIDINE) 500 MG tablet 500 mg twice daily for 1 week, then 1000 mg twice daily.              Physical Exam:   Blood pressure 106/70, pulse 86, weight 65.2 kg (143 lb 12.8 oz), SpO2 98 %.  Wt Readings from Last 4 Encounters:   04/27/21 65.2 kg (143 lb 12.8 oz)   09/29/20 55.1 kg (121 lb 8 oz)   08/25/20 52.2 kg (115 lb)       Constitutional: well-developed, appearing stated age; cooperative  Eyes: nl EOM, PERRLA, vision, conjunctiva, sclera  ENT: nl external ears, nose, hearing, lips, teeth, gums, throat  No mucous membrane lesions, normal saliva pool  Neck: no mass or thyroid enlargement  Resp: lungs clear to auscultation, nl to palpation  CV: RRR, no murmurs, rubs or gallops, no edema  GI: no ABD mass or tenderness, no HSM  MS: The TMJ, neck, shoulder, elbow, wrist, MCP/PIP/DIP examined. L knee shows scant visible effusion. No warmth. No altered knee ROM.  Skin: no nail pitting, alopecia, rash, nodules or lesions  Neuro: nl cranial nerves, strength, sensation, DTRs.   Psych: nl judgement, orientation, memory, affect.         Data:     RHEUM RESULTS Latest Ref Rng & Units 2/24/2011 6/10/2011 10/9/2020   SED RATE 0 - 15 mm/h 10 8 -   AST 0 - 45 U/L - - 25   ALT 0 - 50 U/L 24 9 21   ALBUMIN 3.4 - 5.0 g/dL - - 3.6   WBC 4.0 - 11.0 10e9/L 4.4 5.4 4.7   RBC 3.8 - 5.2 10e12/L 4.53 4.06 4.16   HGB 11.7 - 15.7 g/dL 13.9 12.8 12.6   HCT 35.0 - 47.0 % 40.3 36.4 38.0   MCV 78 - 100 fl 89 90 91   MCHC 31.5 - 36.5 g/dL 34.5 35.2 33.2   RDW 10.0 - 15.0 % 13.1 12.6 12.3    - 450 10e9/L 229 196 205   CREATININE 0.52 - 1.04 mg/dL 0.64 0.56 0.77   GFR ESTIMATE, IF BLACK >60 mL/min/[1.73:m2] GFR not calculated, patient <16 years old. GFR not calculated, patient  <16 years old. >90   GFR ESTIMATE >60 mL/min/[1.73:m2] GFR not calculated, patient <16 years old. GFR not calculated, patient <16 years old. >90   HEPATITIS C ANTIBODY NR:Nonreactive - - Nonreactive

## 2021-05-04 DIAGNOSIS — M08.90 CHRONIC ARTHRITIS OF JUVENILE ONSET (H): ICD-10-CM

## 2021-05-04 LAB
CREAT SERPL-MCNC: 0.71 MG/DL (ref 0.52–1.04)
ERYTHROCYTE [DISTWIDTH] IN BLOOD BY AUTOMATED COUNT: 12.7 % (ref 10–15)
GFR SERPL CREATININE-BSD FRML MDRD: >90 ML/MIN/{1.73_M2}
HCT VFR BLD AUTO: 39.2 % (ref 35–47)
HGB BLD-MCNC: 13 G/DL (ref 11.7–15.7)
MCH RBC QN AUTO: 28.4 PG (ref 26.5–33)
MCHC RBC AUTO-ENTMCNC: 33.2 G/DL (ref 31.5–36.5)
MCV RBC AUTO: 86 FL (ref 78–100)
PLATELET # BLD AUTO: 230 10E9/L (ref 150–450)
RBC # BLD AUTO: 4.58 10E12/L (ref 3.8–5.2)
WBC # BLD AUTO: 5.1 10E9/L (ref 4–11)

## 2021-05-04 PROCEDURE — 82565 ASSAY OF CREATININE: CPT | Performed by: PATHOLOGY

## 2021-05-04 PROCEDURE — 85027 COMPLETE CBC AUTOMATED: CPT | Performed by: PATHOLOGY

## 2021-05-04 PROCEDURE — 36415 COLL VENOUS BLD VENIPUNCTURE: CPT | Performed by: PATHOLOGY

## 2021-06-20 ENCOUNTER — E-VISIT (OUTPATIENT)
Dept: URGENT CARE | Facility: URGENT CARE | Age: 21
End: 2021-06-20
Payer: COMMERCIAL

## 2021-06-20 DIAGNOSIS — H10.31 ACUTE BACTERIAL CONJUNCTIVITIS OF RIGHT EYE: Primary | ICD-10-CM

## 2021-06-20 PROCEDURE — 99421 OL DIG E/M SVC 5-10 MIN: CPT | Performed by: EMERGENCY MEDICINE

## 2021-06-20 RX ORDER — POLYMYXIN B SULFATE AND TRIMETHOPRIM 1; 10000 MG/ML; [USP'U]/ML
1-2 SOLUTION OPHTHALMIC EVERY 4 HOURS
Qty: 10 ML | Refills: 0 | Status: SHIPPED | OUTPATIENT
Start: 2021-06-20 | End: 2021-06-27

## 2021-07-27 ENCOUNTER — MYC MEDICAL ADVICE (OUTPATIENT)
Dept: RHEUMATOLOGY | Facility: CLINIC | Age: 21
End: 2021-07-27

## 2021-07-28 NOTE — TELEPHONE ENCOUNTER
Reviewed and responded asking for additional information.    WILLIAM SalazarN, RN  Rheumatology Care Coordinator  [facility]

## 2021-08-04 NOTE — TELEPHONE ENCOUNTER
Dr Keith's response relayed to pt via [facility].    WILLIAM SalazarN, RN  Rheumatology Care Coordinator  [facility]

## 2021-08-25 DIAGNOSIS — M08.90 CHRONIC ARTHRITIS OF JUVENILE ONSET (H): ICD-10-CM

## 2021-09-05 ENCOUNTER — HEALTH MAINTENANCE LETTER (OUTPATIENT)
Age: 21
End: 2021-09-05

## 2021-10-29 ENCOUNTER — VIRTUAL VISIT (OUTPATIENT)
Dept: RHEUMATOLOGY | Facility: CLINIC | Age: 21
End: 2021-10-29
Attending: INTERNAL MEDICINE
Payer: COMMERCIAL

## 2021-10-29 DIAGNOSIS — M08.90 CHRONIC ARTHRITIS OF JUVENILE ONSET (H): ICD-10-CM

## 2021-10-29 PROCEDURE — 99213 OFFICE O/P EST LOW 20 MIN: CPT | Mod: GT | Performed by: INTERNAL MEDICINE

## 2021-10-29 NOTE — LETTER
10/29/2021       RE: Shabnam Yu  [address] [address] [address]     Dear Colleague,    Thank you for referring your patient, Shabnam Yu, to the [facility] RHEUMATOLOGY [facility] [place] at [facility]. Please see a copy of my visit note below.      Rheumatology Clinic-in person     Shabnam Yu MRN# [id number]   YOB: 2000 Age: 21 year old     Date of Visit: 10/29/2021  Primary care provider: Bebe Bartlett          Assessment and Plan:     # Recurrent oligoarticular inflammatory arthritis:   Patient reports continued improvement in former left knee swelling, pain, and stiffness.  Video examination shows full painless range of motion.  Laboratory evaluation on May 2021 showed creatinine and CBC all normal.    Juvenile inflammatory arthritis with monoarticular symptoms is improved/stable. Reduced frequency and severity of left knee swelling, stiffness, and discomfort has been associated with regular use of adalimumab since Fall 2020.  I think that Humira has been beneficial for inflammatory arthritis.  I recommend continuing it.    Plan:  1.  Continue Humira 40 mg subcutaneous every other week.  2.  Use Benadryl cream on humira injection site up to 4 times daily as needed.  3.  Aleve 220 or 440 mg up to twice daily as needed for knee stiffness and swelling.  4.  Check kidney function and blood counts in early 2022  5. Continue off sulfasalazine    Consider adding back sulfasalazine or methotrexate prn worsening arthritis.    RTC 9 months.    Quang Keith MD  Staff Rheumatologist, [facility]            Active Problem List:     Patient Active Problem List    Diagnosis Date Noted     AYDEN (juvenile idiopathic arthritis) (H) 07/14/2014     Priority: Medium     Myopia 07/14/2014     Priority: Medium            History of Present Illness:   Shabnam Yu follows up for juvenile rheumatoid arthritis.  She was last seen 4-21.  Humira was continued for inflammatory arthritis.    Interval history 10-:    Other than recent cold, health is good. Her knees have not flared recently. She is using humira regularly. She does note warm/redness at site after injection, lasting less than 24 hours.   Occasionally she notes slight extra stiffness or discomfort in the lead up to scheduled humira dosing. No early morning stiffness. She does not use NSAIDs for joint pain.   Time constraints have limited aerobic exercise, but walking extenstively does not exacerbate knee or joint pain.      she is active socially and athletically.  She is carrying a 15 credit load in college.      Interval history 04-:    2nd dose of Moderna vaccine went in 3 weeks ago; she had fever, chills lasting several days.   She was seen in [place] with cold and URI symptoms a month ago.    Her joints are better with regular use of humira. Her left knee occasionally is slightly swollen, but the knee does not affect ADLs.    There are dosing cycle-associated L knee stiffness symptoms. Not using alleve or other OTC remedies for joint symptoms.  She is active socially and athletically.  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    She has some itching and redness at Humira injection site; it lasts a week.    Interval hx 9-:  At the age of 4 patient developed problems with swelling in her knees and hands. Her parents noted alteration in her knee anatomy during soccer practice. She ultimately was diagnosed as having juvenile rheumatoid arthritis with many involved joints (fingers, toes, and knees) and saw Dr. MORALES Silva, a pediatric rheumatologist at [facility]. She was treated with methotrexate and at the age of 11 went into remission. At age 12, she had stopped medication, but her left knee remained swollen. She had an injection in the left knee.    Since then, she reports several instances of swelling and pain in her knee.  "Those were short lived. In late 2019 she began having recurrent difficulties with pain and swelling in the left knee.  She saw Dr. Mckenzie in rheumatology at [facility].  Flare of inflammatory arthritis in the left knee was diagnosed, and the knee was aspirated and injected.  She saw Dr. Mckenzie in follow-up virtually on Patito 3, 2020.  At that time excellent and durable response to   left knee injections was reported, but the left knee had again become swollen, stiff, and painful.  At that point, the plan was to use scheduled naproxen and to start sulfasalazine.  In July, she followed up with Dr. Mckenzie, and reported poor tolerance of naproxen and intermittent continued left knee swelling and pain.  Dr. Mckenzie recommended advancing the dose of sulfasalazine.    On July 11, the L knee again swelling, and was aspirated and injected again. All the swelling has since disappeared. She does not have pain on a daily basis. She has been hiking and canoeing without difficulty. Somestimes, the left knee feels \"off\" compared to the R, but no swelling/loss of ROM. No redness/warmth.    She has had no problems with pain or swelling in joints elsewhere recently with the exception of pain in the right TMJ region.    In the recent past she has been using 600 mg twice daily of ibuprofen. She developed stomach upset and chest pain. She stopped it and that has improved. Sulfasalazine has also been difficult to take because of heartburn, and she has taken it only occasionally at reduced doses since visiting with Dr. Mckenzie.    She has been under increasing stress at school. She has had no history of psoriasis or photosensitivity. There is no history of iritis or uveitis, dry eyes or dry mouth, she has had no problems with pleurisy or asthma. There is no history of stomach ulcers or inflammatory bowel disease. She has had no problems with kidney or liver disease, diabetes or thyroid disease, cytopenias or " neurologic disease. There is no history of hypertension.            Review of Systems:       Constitutional: none  Skin: neg  Eyes: no eye pain or vision change  Ears/Nose/Throat: negative  Respiratory: she has SOB, associated with anxiety  Cardiovascular: negative  Gastrointestinal: + GERD symptoms, stable  Genitourinary: negative  Musculoskeletal: negative  Neurologic: negative  Psychiatric: negative  Hematologic/Lymphatic/Immunologic: neg  Endocrine: negative          Past Medical History:     Past Medical History:   Diagnosis Date     Anxiety      Depressive disorder      Juvenile arthritis (H) 01/2004     Past Surgical History:   Procedure Laterality Date     CARDIAC SURGERY N/A 01/2004     # congenital heart disease: septal defect which was treated with a coiling procedure at age 4.  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  Activity: She walks regularly at school. Has not been exercising regularly. Used to run call cross-country in high school.         Family History:     Family History   Problem Relation Age of Onset     Cancer Maternal Grandmother      Cancer Paternal Grandmother      Cancer Paternal Grandfather      Family History: Negative with respect to inflammatory disease.       Allergies:   No Known Allergies         Medications:     Current Outpatient Medications   Medication Sig Dispense Refill     adalimumab (HUMIRA *CF*) 40 MG/0.4ML pen kit Inject 0.4 mLs (40 mg) Subcutaneous every 14 days Hold for signs of infection, then seek medical attention. 1 each 2      diphenhydrAMINE-zinc acetate (BENADRYL) 2-0.1 % external cream Apply topically 3 times daily as needed for itching 28 g 1     norgestimate-ethinyl estradiol (ORTHO-CYCLEN) 0.25-35 MG-MCG tablet Take 1 tablet by mouth       sertraline (ZOLOFT) 50 MG tablet Take 50 mg by mouth daily              Physical Exam:   There were no vitals taken for this visit.  Wt Readings from Last 4 Encounters:   04/27/21 65.2 kg (143 lb 12.8 oz)   09/29/20 55.1 kg (121 lb 8 oz)   08/25/20 52.2 kg (115 lb)       Constitutional: well-developed, appearing stated age; cooperative  Eyes: nl EOM, PERRLA, vision, conjunctiva, sclera  ENT: nl external ears, nose, hearing, lips, teeth, gums, throat  No mucous membrane lesions, normal saliva pool  Neck: no mass or thyroid enlargement  Resp: lbreathing unlabored  MS: The TMJ, neck, shoulder, elbow, wrist, MCP/PIP/DIP examined. L knee shows no effusion. No altered knee ROM.  Skin: no nail pitting, alopecia, rash, nodules or lesions  Neuro: nl cranial nerves, strength, sensation, DTRs.   Psych: nl judgement, orientation, memory, affect.         Data:     RHEUM RESULTS Latest Ref Rng & Units 11/13/2008 2/5/2009 5/16/2009   ALBUMIN 3.4 - 5.0 g/dL 4.5 - 4.9   ALT 0 - 50 U/L 20 8 11   AST 0 - 45 U/L 39 - -   CREATININE 0.52 - 1.04 mg/dL 0.51 0.49 0.54   GFR ESTIMATE, IF BLACK >60 mL/min/[1.73:m2] GFR not calculated, patient <16 years old. GFR not calculated, patient <16 years old. GFR not calculated, patient <16 years old.   GFR ESTIMATE >60 mL/min/[1.73:m2] GFR not calculated, patient <16 years old. GFR not calculated, patient <16 years old. GFR not calculated, patient <16 years old.   HEMATOCRIT 35.0 - 47.0 % 37.9 39.7 37.2   HEMOGLOBIN 11.7 - 15.7 g/dL 13.5 13.9 13.3   HEPBANG NR:Nonreactive - - -   HCVAB NR:Nonreactive - - -   WBC 4.0 - 11.0 10e9/L 4.4(L) 4.2(L) 4.4(L)   RBC 3.8 - 5.2 10e12/L 4.54 4.54 4.32   RDW 10.0 - 15.0 % 13.2 12.9 12.3   MCHC 31.5 - 36.5 g/dL 35.6 35.0 35.8   MCV 78 - 100 fl 84  87 86   PLATELET COUNT 150 - 450 10e9/L 245 258 227   ESR 0 - 15 mm/h 20(H) - -           Again, thank you for allowing me to participate in the care of your patient.      Sincerely,    Quang Keith MD

## 2021-10-29 NOTE — PROGRESS NOTES
Shabnam is a 21 year old who is being evaluated via a billable video visit.      How would you like to obtain your AVS? MyChart  If the video visit is dropped, the invitation should be resent by: Text to cell phone: [phone]  Will anyone else be joining your video visit? No      Video Start Time: 12:48 PM  Video-Visit Details    Type of service:  Video Visit    Video End Time:1:08 PM    Originating Location (pt. Location): Home    Distant Location (provider location):  [facility] RHEUMATOLOGY [facility] [place]     Platform used for Video Visit: [facility]    Rheumatology Clinic-in person     Shabnam Yu MRN# [id number]   YOB: 2000 Age: 21 year old     Date of Visit: 10/29/2021  Primary care provider: Bebe Bartlett          Assessment and Plan:     # Recurrent oligoarticular inflammatory arthritis:   Patient reports continued improvement in former left knee swelling, pain, and stiffness.  Video examination shows full painless range of motion.  Laboratory evaluation on May 2021 showed creatinine and CBC all normal.    Juvenile inflammatory arthritis with monoarticular symptoms is improved/stable. Reduced frequency and severity of left knee swelling, stiffness, and discomfort has been associated with regular use of adalimumab since Fall 2020.  I think that Humira has been beneficial for inflammatory arthritis.  I recommend continuing it.    Plan:  1.  Continue Humira 40 mg subcutaneous every other week.  2.  Use Benadryl cream on humira injection site up to 4 times daily as needed.  3.  Aleve 220 or 440 mg up to twice daily as needed for knee stiffness and swelling.  4.  Check kidney function and blood counts in early 2022  5. Continue off sulfasalazine    Consider adding back sulfasalazine or methotrexate prn worsening arthritis.    RTC 9 months.    Quang Keith MD  Staff Rheumatologist, [facility]            Active Problem List:     Patient Active Problem List    Diagnosis  Date Noted     AYDEN (juvenile idiopathic arthritis) (H) 07/14/2014     Priority: Medium     Myopia 07/14/2014     Priority: Medium            History of Present Illness:   Shabnam Yu follows up for juvenile rheumatoid arthritis. She was last seen 4-21.  Humira was continued for inflammatory arthritis.    Interval history 10-:    Other than recent cold, health is good. Her knees have not flared recently. She is using humira regularly. She does note warm/redness at site after injection, lasting less than 24 hours.   Occasionally she notes slight extra stiffness or discomfort in the lead up to scheduled humira dosing. No early morning stiffness. She does not use NSAIDs for joint pain.   Time constraints have limited aerobic exercise, but walking extenstively does not exacerbate knee or joint pain.      she is active socially and athletically.  She is carrying a 15 credit load in college.      Interval history 04-:    2nd dose of Moderna vaccine went in 3 weeks ago; she had fever, chills lasting several days.   She was seen in [place] with cold and URI symptoms a month ago.    Her joints are better with regular use of humira. Her left knee occasionally is slightly swollen, but the knee does not affect ADLs.    There are dosing cycle-associated L knee stiffness symptoms. Not using alleve or other OTC remedies for joint symptoms.  She is active socially and athletically.  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    She has some itching and redness at Humira injection site; it lasts a week.    Interval hx 9-:  At the age of 4 patient developed problems with swelling in her knees and hands. Her parents noted alteration in her knee anatomy during soccer practice. She ultimately was diagnosed as having juvenile rheumatoid arthritis with many involved joints (fingers, toes, and knees) and saw Dr. MORALES Silva, a pediatric rheumatologist at [facility]. She was treated with  "methotrexate and at the age of 11 went into remission. At age 12, she had stopped medication, but her left knee remained swollen. She had an injection in the left knee.    Since then, she reports several instances of swelling and pain in her knee. Those were short lived. In late 2019 she began having recurrent difficulties with pain and swelling in the left knee.  She saw Dr. Mckenzie in rheumatology at [facility].  Flare of inflammatory arthritis in the left knee was diagnosed, and the knee was aspirated and injected.  She saw Dr. Mckenzie in follow-up virtually on Patito 3, 2020.  At that time excellent and durable response to   left knee injections was reported, but the left knee had again become swollen, stiff, and painful.  At that point, the plan was to use scheduled naproxen and to start sulfasalazine.  In July, she followed up with Dr. Mckenzie, and reported poor tolerance of naproxen and intermittent continued left knee swelling and pain.  Dr. Mckenzie recommended advancing the dose of sulfasalazine.    On July 11, the L knee again swelling, and was aspirated and injected again. All the swelling has since disappeared. She does not have pain on a daily basis. She has been hiking and canoeing without difficulty. Somestimes, the left knee feels \"off\" compared to the R, but no swelling/loss of ROM. No redness/warmth.    She has had no problems with pain or swelling in joints elsewhere recently with the exception of pain in the right TMJ region.    In the recent past she has been using 600 mg twice daily of ibuprofen. She developed stomach upset and chest pain. She stopped it and that has improved. Sulfasalazine has also been difficult to take because of heartburn, and she has taken it only occasionally at reduced doses since visiting with Dr. Mckenzie.    She has been under increasing stress at school. She has had no history of psoriasis or photosensitivity. There is no history of iritis or " uveitis, dry eyes or dry mouth, she has had no problems with pleurisy or asthma. There is no history of stomach ulcers or inflammatory bowel disease. She has had no problems with kidney or liver disease, diabetes or thyroid disease, cytopenias or neurologic disease. There is no history of hypertension.            Review of Systems:       Constitutional: none  Skin: neg  Eyes: no eye pain or vision change  Ears/Nose/Throat: negative  Respiratory: she has SOB, associated with anxiety  Cardiovascular: negative  Gastrointestinal: + GERD symptoms, stable  Genitourinary: negative  Musculoskeletal: negative  Neurologic: negative  Psychiatric: negative  Hematologic/Lymphatic/Immunologic: neg  Endocrine: negative          Past Medical History:     Past Medical History:   Diagnosis Date     Anxiety      Depressive disorder      Juvenile arthritis (H) 01/2004     Past Surgical History:   Procedure Laterality Date     CARDIAC SURGERY N/A 01/2004     # congenital heart disease: septal defect which was treated with a coiling procedure at age 4.  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  Activity: She walks regularly at school. Has not been exercising regularly. Used to run call cross-country in high school.         Family History:     Family History   Problem Relation Age of Onset     Cancer Maternal Grandmother      Cancer Paternal Grandmother      Cancer Paternal Grandfather      Family History: Negative with respect to inflammatory disease.       Allergies:   No Known Allergies         Medications:      Current Outpatient Medications   Medication Sig Dispense Refill     adalimumab (HUMIRA *CF*) 40 MG/0.4ML pen kit Inject 0.4 mLs (40 mg) Subcutaneous every 14 days Hold for signs of infection, then seek medical attention. 1 each 2     diphenhydrAMINE-zinc acetate (BENADRYL) 2-0.1 % external cream Apply topically 3 times daily as needed for itching 28 g 1     norgestimate-ethinyl estradiol (ORTHO-CYCLEN) 0.25-35 MG-MCG tablet Take 1 tablet by mouth       sertraline (ZOLOFT) 50 MG tablet Take 50 mg by mouth daily              Physical Exam:   There were no vitals taken for this visit.  Wt Readings from Last 4 Encounters:   04/27/21 65.2 kg (143 lb 12.8 oz)   09/29/20 55.1 kg (121 lb 8 oz)   08/25/20 52.2 kg (115 lb)       Constitutional: well-developed, appearing stated age; cooperative  Eyes: nl EOM, PERRLA, vision, conjunctiva, sclera  ENT: nl external ears, nose, hearing, lips, teeth, gums, throat  No mucous membrane lesions, normal saliva pool  Neck: no mass or thyroid enlargement  Resp: lbreathing unlabored  MS: The TMJ, neck, shoulder, elbow, wrist, MCP/PIP/DIP examined. L knee shows no effusion. No altered knee ROM.  Skin: no nail pitting, alopecia, rash, nodules or lesions  Neuro: nl cranial nerves, strength, sensation, DTRs.   Psych: nl judgement, orientation, memory, affect.         Data:     RHEUM RESULTS Latest Ref Rng & Units 11/13/2008 2/5/2009 5/16/2009   ALBUMIN 3.4 - 5.0 g/dL 4.5 - 4.9   ALT 0 - 50 U/L 20 8 11   AST 0 - 45 U/L 39 - -   CREATININE 0.52 - 1.04 mg/dL 0.51 0.49 0.54   GFR ESTIMATE, IF BLACK >60 mL/min/[1.73:m2] GFR not calculated, patient <16 years old. GFR not calculated, patient <16 years old. GFR not calculated, patient <16 years old.   GFR ESTIMATE >60 mL/min/[1.73:m2] GFR not calculated, patient <16 years old. GFR not calculated, patient <16 years old. GFR not calculated, patient <16 years old.   HEMATOCRIT 35.0 - 47.0 % 37.9 39.7 37.2   HEMOGLOBIN 11.7 - 15.7 g/dL 13.5 13.9 13.3    HEPBANG NR:Nonreactive - - -   HCVAB NR:Nonreactive - - -   WBC 4.0 - 11.0 10e9/L 4.4(L) 4.2(L) 4.4(L)   RBC 3.8 - 5.2 10e12/L 4.54 4.54 4.32   RDW 10.0 - 15.0 % 13.2 12.9 12.3   MCHC 31.5 - 36.5 g/dL 35.6 35.0 35.8   MCV 78 - 100 fl 84 87 86   PLATELET COUNT 150 - 450 10e9/L 245 258 227   ESR 0 - 15 mm/h 20(H) - -

## 2021-10-29 NOTE — PATIENT INSTRUCTIONS
Diagnosis:  1.  Juvenile onset inflammatory arthritis: Reduced knee swelling, stiffness, and pain since regular use of Humira started in October 2020.  I recommend continuing every other week Humira.  2.  Prolonged injection site reactions with itching and redness: I recommend continuing Benadryl-containing cream.  3.  Humira dosing cycle dependent knee stiffness and discomfort.  For stiffness and pain, you may intermittently use Aleve.    Plan:  1.  Continue Humira 40 mg subcutaneous every other week.  2.  Use Benadryl cream on injection site up to 4 times daily as needed.  3.  Aleve 220 or 440 mg up to twice daily as needed for knee stiffness and swelling.  4.  Check kidney function and blood counts early in 2021.

## 2021-12-26 ENCOUNTER — HEALTH MAINTENANCE LETTER (OUTPATIENT)
Age: 21
End: 2021-12-26

## 2022-01-10 ENCOUNTER — TELEPHONE (OUTPATIENT)
Dept: RHEUMATOLOGY | Facility: CLINIC | Age: 22
End: 2022-01-10
Payer: COMMERCIAL

## 2022-01-10 DIAGNOSIS — M08.90 CHRONIC ARTHRITIS OF JUVENILE ONSET (H): Primary | ICD-10-CM

## 2022-01-10 NOTE — TELEPHONE ENCOUNTER
[facility] Call Center    Phone Message    May a detailed message be left on voicemail: yes     Reason for Call: Order(s): Other:   Reason for requested: Has lab appt scheduled tomorrow afternoon, needs orders placed. Per Dr. Keith's notes from last virtual visit, pt needs to get kidney function and blood counts checked early 2022.   Date needed: ASAP-has lab appt scheduled for tomorrow (1/10/22)   Provider name: Dr. Quang Keith    Action Taken: Message routed to [facility] RHEUMATOLOGY [facility]ULT [facility]      Travel Screening: Not Applicable

## 2022-01-10 NOTE — TELEPHONE ENCOUNTER
Request for lab orders routed to Dr. Keith for review.   Message sent to patient explaining that request has been sent to Dr. Keith and she will be notified when orders have been entered.  Celi Engle RN  Adult Rheumatology Clinic

## 2022-01-10 NOTE — TELEPHONE ENCOUNTER
Placed call stan patient to inform her that Dr. Keith has ordered lab tests. Left  requesting patient check MyChart for an update.  Celi Engle RN  Adult Rheumatology Clinic

## 2022-01-11 ENCOUNTER — LAB (OUTPATIENT)
Dept: LAB | Facility: CLINIC | Age: 22
End: 2022-01-11
Payer: COMMERCIAL

## 2022-01-11 DIAGNOSIS — M08.90 CHRONIC ARTHRITIS OF JUVENILE ONSET (H): ICD-10-CM

## 2022-01-11 LAB
ERYTHROCYTE [DISTWIDTH] IN BLOOD BY AUTOMATED COUNT: 12.2 % (ref 10–15)
HCT VFR BLD AUTO: 40 % (ref 35–47)
HGB BLD-MCNC: 13 G/DL (ref 11.7–15.7)
MCH RBC QN AUTO: 28.4 PG (ref 26.5–33)
MCHC RBC AUTO-ENTMCNC: 32.5 G/DL (ref 31.5–36.5)
MCV RBC AUTO: 88 FL (ref 78–100)
PLATELET # BLD AUTO: 153 10E3/UL (ref 150–450)
RBC # BLD AUTO: 4.57 10E6/UL (ref 3.8–5.2)
WBC # BLD AUTO: 4.4 10E3/UL (ref 4–11)

## 2022-01-11 PROCEDURE — 84450 TRANSFERASE (AST) (SGOT): CPT

## 2022-01-11 PROCEDURE — 84460 ALANINE AMINO (ALT) (SGPT): CPT

## 2022-01-11 PROCEDURE — 85027 COMPLETE CBC AUTOMATED: CPT

## 2022-01-11 PROCEDURE — 82565 ASSAY OF CREATININE: CPT

## 2022-01-11 PROCEDURE — 36415 COLL VENOUS BLD VENIPUNCTURE: CPT

## 2022-01-12 LAB
ALT SERPL W P-5'-P-CCNC: 16 U/L (ref 0–50)
AST SERPL W P-5'-P-CCNC: 20 U/L (ref 0–45)
CREAT SERPL-MCNC: 0.74 MG/DL (ref 0.52–1.04)
GFR SERPL CREATININE-BSD FRML MDRD: >90 ML/MIN/1.73M2

## 2022-05-25 NOTE — PROGRESS NOTES
Rheumatology Clinic-in person     Shabnam Yu MRN# [id number]   YOB: 2000 Age: 21 year old     Date of Visit: 05/27/2022  Primary care provider: Bebe Bartlett          Assessment and Plan:     # Recurrent oligoarticular inflammatory arthritis:   Patient reports continued improvement in former left knee swelling, pain, and stiffness.  Exam shows full painless range of motion. Blood work on January 11, 2022 showed creatinine, transaminases, and CBC all normal.    Juvenile inflammatory arthritis with monoarticular symptoms is improved/stable. Reduced frequency and severity of left knee swelling, stiffness, and discomfort has been associated with regular use of adalimumab since Fall 2020.  I think that Humira has been beneficial for inflammatory arthritis.  I recommend continuing it.    # URI 5-2022: Fever and constitutional symptoms have decreased in the last several days.  I think that the worst of the viral syndrome is passed.  I recommend going ahead with scheduled Humira.    Plan:  1.  Continue Humira 40 mg subcutaneous every other week.  2.  Use Benadryl cream on injection site up to 4 times daily as needed.  3.  Aleve 220 or 440 mg up to twice daily as needed for knee stiffness and swelling.  4.  Check kidney function and blood counts in Fall 2022.    Consider adding back sulfasalazine or methotrexate prn worsening arthritis.    RTC 9 months.    Quang Keith MD  [facility]            Active Problem List:     Patient Active Problem List    Diagnosis Date Noted     AYDEN (juvenile idiopathic arthritis) (H) 07/14/2014     Priority: Medium     Myopia 07/14/2014     Priority: Medium            History of Present Illness:   Shabnam Yu follows up for juvenile rheumatoid arthritis. She was last seen 10-21.  Humira was continued for inflammatory arthritis.    Interval history May 27, 2022    She developed fever, fatigue, weakness, and ST, sinus  congestion last week. She was tested for viral pathogens, all negative. She used fluids, rest, and acetaminophen; fever has broken, but ST persists.    Before the recent illness, she had been exercising regularly with gym workouts or 3-4 mile walks. Minimal joint pain. She reports noJIA flareups in months, and she feels much more confident about exercise without inducing flare.     She notes occasional knee discomfort for a few days before scheduled humira injections. She has been regular with using humira. Still has red/puffy at site lasting 1-2 days, occasionally bruising.    She will start new job at [employer]    Interval history 10-:    Other than recent cold, health is good. Her knees have not flared recently. She is using humira regularly. She does note warm/redness at site after injection, lasting less than 24 hours.   Occasionally she notes slight extra stiffness or discomfort in the lead up to scheduled humira dosing. No early morning stiffness. She does not use NSAIDs for joint pain.   Time constraints have limited aerobic exercise, but walking extenstively does not exacerbate knee or joint pain.    she is active socially and athletically.  She is carrying a 15 credit load in college.    Background hx 9-, transition to adult care:  At the age of 4 patient developed problems with swelling in her knees and hands. Her parents noted alteration in her knee anatomy during soccer practice. She ultimately was diagnosed as having juvenile rheumatoid arthritis with many involved joints (fingers, toes, and knees) and saw Dr. MORALES Silva, a pediatric rheumatologist at [facility]. She was treated with methotrexate and at the age of 11 went into remission. At age 12, she had stopped medication, but her left knee remained swollen. She had an injection in the left knee.    Since then, she reports several instances of swelling and pain in her knee. Those were short lived. In late 2019 she began  "having recurrent difficulties with pain and swelling in the left knee.  She saw Dr. Mckenzie in rheumatology at [facility].  Flare of inflammatory arthritis in the left knee was diagnosed, and the knee was aspirated and injected.  She saw Dr. Mckenzie in follow-up virtually on Pat[place] 3, 2020.  At that time excellent and durable response to   left knee injections was reported, but the left knee had again become swollen, stiff, and painful.  At that point, the plan was to use scheduled naproxen and to start sulfasalazine.  In July, she followed up with Dr. Mckenzie, and reported poor tolerance of naproxen and intermittent continued left knee swelling and pain.  Dr. Mckenzie recommended advancing the dose of sulfasalazine.    On July 11, the L knee again swelling, and was aspirated and injected again. All the swelling has since disappeared. She does not have pain on a daily basis. She has been hiking and canoeing without difficulty. Somestimes, the left knee feels \"off\" compared to the R, but no swelling/loss of ROM. No redness/warmth.    She has had no problems with pain or swelling in joints elsewhere recently with the exception of pain in the right TMJ region.    In the recent past she has been using 600 mg twice daily of ibuprofen. She developed stomach upset and chest pain. She stopped it and that has improved. Sulfasalazine has also been difficult to take because of heartburn, and she has taken it only occasionally at reduced doses since visiting with Dr. Mckenzie.    She has been under increasing stress at school. She has had no history of psoriasis or photosensitivity. There is no history of iritis or uveitis, dry eyes or dry mouth, she has had no problems with pleurisy or asthma. There is no history of stomach ulcers or inflammatory bowel disease. She has had no problems with kidney or liver disease, diabetes or thyroid disease, cytopenias or neurologic disease. There is no history of " hypertension.            Review of Systems:       Constitutional: none  Skin: neg  Eyes: no eye pain or vision change  Ears/Nose/Throat: negative  Respiratory: she has SOB, associated with anxiety  Cardiovascular: negative  Gastrointestinal: + GERD symptoms, stable  Genitourinary: negative  Musculoskeletal: negative  Neurologic: negative  Psychiatric: negative  Hematologic/Lymphatic/Immunologic: neg  Endocrine: negative          Past Medical History:     Past Medical History:   Diagnosis Date     Anxiety      Depressive disorder      Juvenile arthritis (H) 01/2004     Past Surgical History:   Procedure Laterality Date     CARDIAC SURGERY N/A 01/2004     # congenital heart disease: septal defect which was treated with a coiling procedure at age 4.  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  Activity: She walks regularly         Family History:     Family History   Problem Relation Age of Onset     Cancer Maternal Grandmother      Cancer Paternal Grandmother      Cancer Paternal Grandfather      Family History: Negative with respect to inflammatory disease.       Allergies:   No Known Allergies         Medications:     Current Outpatient Medications   Medication Sig Dispense Refill     adalimumab (HUMIRA *CF*) 40 MG/0.4ML pen kit Inject 0.4 mLs (40 mg) Subcutaneous every 14 days Hold for signs of infection, then seek medical attention. 2 each 6     diphenhydrAMINE-zinc acetate (BENADRYL) 2-0.1 % external cream Apply topically 3 times daily as needed for itching 28 g 1     norgestimate-ethinyl estradiol (ORTHO-CYCLEN) 0.25-35 MG-MCG  tablet Take 1 tablet by mouth       sertraline (ZOLOFT) 50 MG tablet Take 50 mg by mouth daily              Physical Exam:   Blood pressure 112/77, pulse 90, temperature 98.2  F (36.8  C), temperature source Oral, weight 64.5 kg (142 lb 3.2 oz), SpO2 97 %.  Wt Readings from Last 4 Encounters:   05/27/22 64.5 kg (142 lb 3.2 oz)   04/27/21 65.2 kg (143 lb 12.8 oz)   09/29/20 55.1 kg (121 lb 8 oz)   08/25/20 52.2 kg (115 lb)       Constitutional: well-developed, appearing stated age; cooperative  Eyes: nl EOM, PERRLA, vision, conjunctiva, sclera  ENT: nl external ears, nose, hearing, lips, teeth, gums, throat  No mucous membrane lesions, normal saliva pool  Neck: no mass or thyroid enlargement  Resp: lbreathing unlabored  MS: The TMJ, neck, shoulder, elbow, wrist, MCP/PIP/DIP examined. L knee shows no effusion. No altered knee ROM.  Skin: no nail pitting, alopecia, rash, nodules or lesions  Neuro: nl cranial nerves, strength, sensation, DTRs.   Psych: nl judgement, orientation, memory, affect.         Data:     RHEUM RESULTS Latest Ref Rng & Units 11/13/2008 2/5/2009 5/16/2009   ALBUMIN 3.4 - 5.0 g/dL 4.5 - 4.9   ALT 0 - 50 U/L 20 8 11   AST 0 - 45 U/L 39 - -   CREATININE 0.52 - 1.04 mg/dL 0.51 0.49 0.54   GFR ESTIMATE, IF BLACK >60 mL/min/[1.73:m2] GFR not calculated, patient <16 years old. GFR not calculated, patient <16 years old. GFR not calculated, patient <16 years old.   GFR ESTIMATE >60 mL/min/1.73m2 GFR not calculated, patient <16 years old. GFR not calculated, patient <16 years old. GFR not calculated, patient <16 years old.   HEMATOCRIT 35.0 - 47.0 % 37.9 39.7 37.2   HEMOGLOBIN 11.7 - 15.7 g/dL 13.5 13.9 13.3   HEPBANG NR:Nonreactive - - -   HCVAB NR:Nonreactive - - -   WBC 4.0 - 11.0 10e3/uL 4.4(L) 4.2(L) 4.4(L)   RBC 3.80 - 5.20 10e6/uL 4.54 4.54 4.32   RDW 10.0 - 15.0 % 13.2 12.9 12.3   MCHC 31.5 - 36.5 g/dL 35.6 35.0 35.8   MCV 78 - 100 fL 84 87 86   PLATELET COUNT 150 - 450 10e3/uL 245 258 227   ESR 0 -  15 mm/h 20(H) - -

## 2022-05-27 ENCOUNTER — OFFICE VISIT (OUTPATIENT)
Dept: RHEUMATOLOGY | Facility: CLINIC | Age: 22
End: 2022-05-27
Attending: INTERNAL MEDICINE
Payer: COMMERCIAL

## 2022-05-27 VITALS
WEIGHT: 142.2 LBS | OXYGEN SATURATION: 97 % | HEART RATE: 90 BPM | DIASTOLIC BLOOD PRESSURE: 77 MMHG | BODY MASS INDEX: 25.19 KG/M2 | TEMPERATURE: 98.2 F | SYSTOLIC BLOOD PRESSURE: 112 MMHG

## 2022-05-27 DIAGNOSIS — M08.90 CHRONIC ARTHRITIS OF JUVENILE ONSET (H): Primary | ICD-10-CM

## 2022-05-27 PROCEDURE — 99214 OFFICE O/P EST MOD 30 MIN: CPT | Performed by: INTERNAL MEDICINE

## 2022-05-27 ASSESSMENT — PAIN SCALES - GENERAL: PAINLEVEL: NO PAIN (0)

## 2022-05-27 NOTE — LETTER
5/27/2022       RE: Shabnam Yu  [id number] [facility] [address]     Dear Colleague,    Thank you for referring your patient, Shabnam Yu, to the [facility] RHEUMATOLOGY [facility]INIC [place] at [facility]. Please see a copy of my visit note below.      Rheumatology Clinic-in person     Shabnam Yu MRN# [id number]   YOB: 2000 Age: 21 year old     Date of Visit: 05/27/2022  Primary care provider: Bebe Bartlett          Assessment and Plan:     # Recurrent oligoarticular inflammatory arthritis:   Patient reports continued improvement in former left knee swelling, pain, and stiffness.  Exam shows full painless range of motion. Blood work on January 11, 2022 showed creatinine, transaminases, and CBC all normal.    Juvenile inflammatory arthritis with monoarticular symptoms is improved/stable. Reduced frequency and severity of left knee swelling, stiffness, and discomfort has been associated with regular use of adalimumab since Fall 2020.  I think that Humira has been beneficial for inflammatory arthritis.  I recommend continuing it.    # URI 5-2022: Fever and constitutional symptoms have decreased in the last several days.  I think that the worst of the viral syndrome is passed.  I recommend going ahead with scheduled Humira.    Plan:  1.  Continue Humira 40 mg subcutaneous every other week.  2.  Use Benadryl cream on injection site up to 4 times daily as needed.  3.  Aleve 220 or 440 mg up to twice daily as needed for knee stiffness and swelling.  4.  Check kidney function and blood counts in Fall 2022.    Consider adding back sulfasalazine or methotrexate prn worsening arthritis.    RTC 9 months.    Quang Keith MD  Staff Rheumatologist, [facility]            Active Problem List:     Patient Active Problem List    Diagnosis Date Noted     AYDEN (juvenile idiopathic arthritis) (H) 07/14/2014     Priority:  Medium     Myopia 07/14/2014     Priority: Medium            History of Present Illness:   Shabnam Yu follows up for juvenile rheumatoid arthritis. She was last seen 10-21.  Humira was continued for inflammatory arthritis.    Interval history May 27, 2022    She developed fever, fatigue, weakness, and ST, sinus congestion last week. She was tested for viral pathogens, all negative. She used fluids, rest, and acetaminophen; fever has broken, but ST persists.    Before the recent illness, she had been exercising regularly with gym workouts or 3-4 mile walks. Minimal joint pain. She reports noJIA flareups in months, and she feels much more confident about exercise without inducing flare.     She notes occasional knee discomfort for a few days before scheduled humira injections. She has been regular with using humira. Still has red/puffy at site lasting 1-2 days, occasionally bruising.    She will start new job at [employer]    Interval history 10-:    Other than recent cold, health is good. Her knees have not flared recently. She is using humira regularly. She does note warm/redness at site after injection, lasting less than 24 hours.   Occasionally she notes slight extra stiffness or discomfort in the lead up to scheduled humira dosing. No early morning stiffness. She does not use NSAIDs for joint pain.   Time constraints have limited aerobic exercise, but walking extenstively does not exacerbate knee or joint pain.    she is active socially and athletically.  She is carrying a 15 credit load in college.    Background hx 8-, transition to adult care:  At the age of 4 patient developed problems with swelling in her knees and hands. Her parents noted alteration in her knee anatomy during soccer practice. She ultimately was diagnosed as having juvenile rheumatoid arthritis with many involved joints (fingers, toes, and knees) and saw Dr. MORALES Silva, a pediatric rheumatologist at [facility]  "[facility]. She was treated with methotrexate and at the age of 11 went into remission. At age 12, she had stopped medication, but her left knee remained swollen. She had an injection in the left knee.    Since then, she reports several instances of swelling and pain in her knee. Those were short lived. In late 2019 she began having recurrent difficulties with pain and swelling in the left knee.  She saw Dr. Mckenzie in rheumatology at [facility].  Flare of inflammatory arthritis in the left knee was diagnosed, and the knee was aspirated and injected.  She saw Dr. Mckenzie in follow-up virtually on Patito 3, 2020.  At that time excellent and durable response to   left knee injections was reported, but the left knee had again become swollen, stiff, and painful.  At that point, the plan was to use scheduled naproxen and to start sulfasalazine.  In July, she followed up with Dr. Mckenzie, and reported poor tolerance of naproxen and intermittent continued left knee swelling and pain.  Dr. Mckenzie recommended advancing the dose of sulfasalazine.    On July 11, the L knee again swelling, and was aspirated and injected again. All the swelling has since disappeared. She does not have pain on a daily basis. She has been hiking and canoeing without difficulty. Somestimes, the left knee feels \"off\" compared to the R, but no swelling/loss of ROM. No redness/warmth.    She has had no problems with pain or swelling in joints elsewhere recently with the exception of pain in the right TMJ region.    In the recent past she has been using 600 mg twice daily of ibuprofen. She developed stomach upset and chest pain. She stopped it and that has improved. Sulfasalazine has also been difficult to take because of heartburn, and she has taken it only occasionally at reduced doses since visiting with Dr. Mckenzie.    She has been under increasing stress at school. She has had no history of psoriasis or photosensitivity. " There is no history of iritis or uveitis, dry eyes or dry mouth, she has had no problems with pleurisy or asthma. There is no history of stomach ulcers or inflammatory bowel disease. She has had no problems with kidney or liver disease, diabetes or thyroid disease, cytopenias or neurologic disease. There is no history of hypertension.            Review of Systems:       Constitutional: none  Skin: neg  Eyes: no eye pain or vision change  Ears/Nose/Throat: negative  Respiratory: she has SOB, associated with anxiety  Cardiovascular: negative  Gastrointestinal: + GERD symptoms, stable  Genitourinary: negative  Musculoskeletal: negative  Neurologic: negative  Psychiatric: negative  Hematologic/Lymphatic/Immunologic: neg  Endocrine: negative          Past Medical History:     Past Medical History:   Diagnosis Date     Anxiety      Depressive disorder      Juvenile arthritis (H) 01/2004     Past Surgical History:   Procedure Laterality Date     CARDIAC SURGERY N/A 01/2004     # congenital heart disease: septal defect which was treated with a coiling procedure at age 4.  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  Activity: She walks regularly         Family History:     Family History   Problem Relation Age of Onset     Cancer Maternal Grandmother      Cancer Paternal Grandmother      Cancer Paternal Grandfather      Family History: Negative with respect to inflammatory disease.       Allergies:   No Known Allergies         Medications:     Current Outpatient Medications   Medication Sig Dispense Refill     adalimumab (HUMIRA *CF*) 40  MG/0.4ML pen kit Inject 0.4 mLs (40 mg) Subcutaneous every 14 days Hold for signs of infection, then seek medical attention. 2 each 6     diphenhydrAMINE-zinc acetate (BENADRYL) 2-0.1 % external cream Apply topically 3 times daily as needed for itching 28 g 1     norgestimate-ethinyl estradiol (ORTHO-CYCLEN) 0.25-35 MG-MCG tablet Take 1 tablet by mouth       sertraline (ZOLOFT) 50 MG tablet Take 50 mg by mouth daily              Physical Exam:   Blood pressure 112/77, pulse 90, temperature 98.2  F (36.8  C), temperature source Oral, weight 64.5 kg (142 lb 3.2 oz), SpO2 97 %.  Wt Readings from Last 4 Encounters:   05/27/22 64.5 kg (142 lb 3.2 oz)   04/27/21 65.2 kg (143 lb 12.8 oz)   09/29/20 55.1 kg (121 lb 8 oz)   08/25/20 52.2 kg (115 lb)       Constitutional: well-developed, appearing stated age; cooperative  Eyes: nl EOM, PERRLA, vision, conjunctiva, sclera  ENT: nl external ears, nose, hearing, lips, teeth, gums, throat  No mucous membrane lesions, normal saliva pool  Neck: no mass or thyroid enlargement  Resp: lbreathing unlabored  MS: The TMJ, neck, shoulder, elbow, wrist, MCP/PIP/DIP examined. L knee shows no effusion. No altered knee ROM.  Skin: no nail pitting, alopecia, rash, nodules or lesions  Neuro: nl cranial nerves, strength, sensation, DTRs.   Psych: nl judgement, orientation, memory, affect.         Data:     RHEUM RESULTS Latest Ref Rng & Units 11/13/2008 2/5/2009 5/16/2009   ALBUMIN 3.4 - 5.0 g/dL 4.5 - 4.9   ALT 0 - 50 U/L 20 8 11   AST 0 - 45 U/L 39 - -   CREATININE 0.52 - 1.04 mg/dL 0.51 0.49 0.54   GFR ESTIMATE, IF BLACK >60 mL/min/[1.73:m2] GFR not calculated, patient <16 years old. GFR not calculated, patient <16 years old. GFR not calculated, patient <16 years old.   GFR ESTIMATE >60 mL/min/1.73m2 GFR not calculated, patient <16 years old. GFR not calculated, patient <16 years old. GFR not calculated, patient <16 years old.   HEMATOCRIT 35.0 - 47.0 % 37.9 39.7 37.2   HEMOGLOBIN 11.7 - 15.7  g/dL 13.5 13.9 13.3   HEPBANG NR:Nonreactive - - -   HCVAB NR:Nonreactive - - -   WBC 4.0 - 11.0 10e3/uL 4.4(L) 4.2(L) 4.4(L)   RBC 3.80 - 5.20 10e6/uL 4.54 4.54 4.32   RDW 10.0 - 15.0 % 13.2 12.9 12.3   MCHC 31.5 - 36.5 g/dL 35.6 35.0 35.8   MCV 78 - 100 fL 84 87 86   PLATELET COUNT 150 - 450 10e3/uL 245 258 227   ESR 0 - 15 mm/h 20(H) - -

## 2022-05-27 NOTE — PATIENT INSTRUCTIONS
Diagnosis:  1.  Juvenile onset inflammatory arthritis: Reduced frequency of L knee swelling, stiffness, and pain continues, dating from regular use of Humira started in October 2020.  There is low disease activity of AYDEN. I recommend continuing every other week Humira.  2.  injection site reactions with itching and redness: I recommend continuing Benadryl-containing cream.  3.  Humira dosing cycle dependent knee stiffness and discomfort.  For stiffness and pain, you may intermittently use Aleve.    Plan:  1.  Continue Humira 40 mg subcutaneous every other week.  2.  Use Benadryl cream on injection site up to 4 times daily as needed.  3.  Aleve 220 or 440 mg up to twice daily as needed for knee stiffness and swelling.  4.  Check kidney function and blood counts in Fall 2022.    5. Ok to take humira this weekend

## 2022-06-01 ENCOUNTER — LAB REQUISITION (OUTPATIENT)
Dept: LAB | Facility: CLINIC | Age: 22
End: 2022-06-01

## 2022-06-01 PROCEDURE — 86481 TB AG RESPONSE T-CELL SUSP: CPT | Performed by: INTERNAL MEDICINE

## 2022-06-03 LAB
GAMMA INTERFERON BACKGROUND BLD IA-ACNC: 0.04 IU/ML
M TB IFN-G BLD-IMP: NEGATIVE
M TB IFN-G CD4+ BCKGRND COR BLD-ACNC: 9.96 IU/ML
MITOGEN IGNF BCKGRD COR BLD-ACNC: -0.01 IU/ML
MITOGEN IGNF BCKGRD COR BLD-ACNC: 0 IU/ML
QUANTIFERON MITOGEN: 10 IU/ML
QUANTIFERON NIL TUBE: 0.04 IU/ML
QUANTIFERON TB1 TUBE: 0.03 IU/ML
QUANTIFERON TB2 TUBE: 0.04

## 2022-09-02 ENCOUNTER — LAB (OUTPATIENT)
Dept: LAB | Facility: CLINIC | Age: 22
End: 2022-09-02
Payer: COMMERCIAL

## 2022-09-02 DIAGNOSIS — M08.90 CHRONIC ARTHRITIS OF JUVENILE ONSET (H): ICD-10-CM

## 2022-09-02 LAB
CRP SERPL-MCNC: 10.3 MG/L
ERYTHROCYTE [DISTWIDTH] IN BLOOD BY AUTOMATED COUNT: 12.1 % (ref 10–15)
HCT VFR BLD AUTO: 39.9 % (ref 35–47)
HGB BLD-MCNC: 13.5 G/DL (ref 11.7–15.7)
MCH RBC QN AUTO: 30 PG (ref 26.5–33)
MCHC RBC AUTO-ENTMCNC: 33.8 G/DL (ref 31.5–36.5)
MCV RBC AUTO: 89 FL (ref 78–100)
PLATELET # BLD AUTO: 212 10E3/UL (ref 150–450)
RBC # BLD AUTO: 4.5 10E6/UL (ref 3.8–5.2)
WBC # BLD AUTO: 5.9 10E3/UL (ref 4–11)

## 2022-09-02 PROCEDURE — 85027 COMPLETE CBC AUTOMATED: CPT

## 2022-09-02 PROCEDURE — 86140 C-REACTIVE PROTEIN: CPT

## 2022-09-02 PROCEDURE — 82565 ASSAY OF CREATININE: CPT

## 2022-09-02 PROCEDURE — 36415 COLL VENOUS BLD VENIPUNCTURE: CPT

## 2022-09-03 LAB
CREAT SERPL-MCNC: 0.78 MG/DL (ref 0.52–1.04)
GFR SERPL CREATININE-BSD FRML MDRD: >90 ML/MIN/1.73M2

## 2022-09-14 DIAGNOSIS — M08.90 CHRONIC ARTHRITIS OF JUVENILE ONSET (H): ICD-10-CM

## 2022-09-15 ENCOUNTER — MYC MEDICAL ADVICE (OUTPATIENT)
Dept: RHEUMATOLOGY | Facility: CLINIC | Age: 22
End: 2022-09-15

## 2022-09-15 RX ORDER — ADALIMUMAB 40MG/0.4ML
KIT SUBCUTANEOUS
Qty: 2 EACH | Refills: 6 | Status: SHIPPED | OUTPATIENT
Start: 2022-09-15 | End: 2022-12-06

## 2022-10-10 ENCOUNTER — TELEPHONE (OUTPATIENT)
Dept: RHEUMATOLOGY | Facility: CLINIC | Age: 22
End: 2022-10-10

## 2022-10-10 NOTE — TELEPHONE ENCOUNTER
PA Initiation    Medication: HUMIRA PA renewal   Insurance Company: [employer] ([facility]) - Phone [phone] Fax [phone]  Pharmacy Filling the Rx: [facility] - [place], [place] - [facility] [address]  Filling Pharmacy Phone:    Filling Pharmacy Fax:    Start Date: 10/10/2022    LORAINE BACK ([place]: [id number])

## 2022-10-18 NOTE — TELEPHONE ENCOUNTER
Prior Authorization Approval    Authorization Effective Date: 10/10/2022  Authorization Expiration Date: 10/10/2023  Medication: HUMIRA PA renewal   Approved Dose/Quantity: [id number]  Reference #: [id number]   Insurance Company: [employer] ([facility]) - Phone [phone] Fax [phone]  Expected CoPay:       CoPay Card Available:      Foundation Assistance Needed:    Which Pharmacy is filling the prescription (Not needed for infusion/clinic administered): [facility] - [place], [place] - [address]  Pharmacy Notified: Yes  Patient Notified: Yes

## 2022-10-23 ENCOUNTER — HEALTH MAINTENANCE LETTER (OUTPATIENT)
Age: 22
End: 2022-10-23

## 2022-11-17 ENCOUNTER — MYC MEDICAL ADVICE (OUTPATIENT)
Dept: RHEUMATOLOGY | Facility: CLINIC | Age: 22
End: 2022-11-17

## 2022-11-17 DIAGNOSIS — M08.90 CHRONIC ARTHRITIS OF JUVENILE ONSET (H): ICD-10-CM

## 2022-11-17 DIAGNOSIS — M25.462 EFFUSION, LEFT KNEE: Primary | ICD-10-CM

## 2022-11-18 NOTE — TELEPHONE ENCOUNTER
Patient returned call to this RN.    In patient's earlier ADOR message, patient noted-    I started having a flare up following October 29th after I did a more intensive cardio workout on the treadmill. My inflammation got better for a short period of time, but never fully went away. Over the past 2 weeks my inflammation has been getting worse. Since Monday this week, my left knee has been extremely swollen. 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 On top of that, I am no longer able to continue working out routinely without my knee getting more swollen. I am in a lot of pain and have lost a lot of my range of motion. It has become more and more difficult to move around. I know that I have an appointment coming up soon on Dec 2, but is there any way I could come in sooner or do something to help my inflammation in the mean time? Also, if I can not get this inflammation under control, could I potentially receive a cortisone shot at my next appointment?     -What joints are involved?- it is just her left knee, it is swollen, warm to touch, not redended.    -How would you describe your pain/rate your pain? 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 Symptoms worsen throughout the day because she is on her feet all day.  -Is this a usual flare for you? Yes it is always her left knee  -Any triggers? Recent increase is athletic training, improved diet.  -Are you doing any OTC treatment for your symptoms?  Ice, elevation, compression sleeve. Two ibuprofen tablets prn at bedtime.  -Are you taking your medications as prescribed? Any missed doses?  Since May, has been taking adalimumab (Humira) as prescribed, prior to that, was not taking adalimumab (Humira)  Regularly.    Patient is requesting a cortisone injection, has never taken prednisone.    Will route to Dr. Keith for review of symptoms.    Celi De La Rosa, BSN, RN  RN  Care Coordinator Rheumatology

## 2022-11-18 NOTE — TELEPHONE ENCOUNTER
I am sorry to hear about the increasing knee symptoms and swelling.  My impression is that a persistent and growing knee effusion is present that is restricting range of motion and impairing activities of daily living including athletic activities.  Effusion is likely related to underlying systemic inflammatory disease.  I think that local treatment of the knee is appropriate.  If patient can be seen in local sports medicine for consideration of knee aspiration and injection, I recommend that as a first-line plan.  Let me know if that will not be possible for patient.

## 2022-11-18 NOTE — TELEPHONE ENCOUNTER
Message left for patient to call this RN back directly.    Will also send [employer] message.    JESSICA Benavidez, RN  RN Care Coordinator Rheumatology

## 2022-11-21 NOTE — TELEPHONE ENCOUNTER
Message left for patient regarding sports medicine referral, encouraged to call and schedule- [phone]. [employer] message also sent with Dr. Keith's response.    WILLIAM BenavidezN, RN  RN Care Coordinator Rheumatology

## 2022-11-21 NOTE — PROGRESS NOTES
ASSESSMENT & PLAN  Patient Instructions     1. Effusion, left knee    2. Chronic arthritis of juvenile onset (H)      -Patient has chronic left knee pain and swelling due to juvenile rheumatoid arthritis  -Patient tolerated aspiration and cortisone injection today without complications.  Patient was given postprocedure instruction.  Synovial fluid was sent for analysis including cell count with differential, crystals, gram stain and fluid culture.  -We had a long in-depth discussion regarding patient's lifelong history of juvenile rheumatoid arthritis including treatments. Patient reports her last left knee intra-articular cortisone injection was 2 years ago.  She believes she had 2 other cortisone injections in the previous years and an additional 1 when she was 4 years old.  Patient was educated that repeat cortisone injections can cause damage to the bones including avascular necrosis, increased risk for tendon injuries and cartilage damage.  -Patient will start formal physical therapy and home exercise program to strengthen and stabilize her leg and knee  -Patient is following up with her rheumatologist next week for further medical management for her rheumatoid arthritis  -Call direct clinic number [[phone]] at any time with questions or concerns.    Albert Yeo MD [facility] Orthopedics and Sports Medicine  [facility]          -----    SUBJECTIVE  Shabnam Yu is a/an 22 year old female who is seen in consultation at the request of  Quang Keith M.D. for evaluation of left knee pain. The patient is seen by themselves.    Onset: 1 month(s) ago. Patient denies injury, states she did a new hard exercise workout the day after a Halloween party. Notes increased swelling since   Location of Pain: left entire knee swelling   Rating of Pain at worst: 6/10  Rating of Pain Currently: 6/10  Worsened by: any exercise/extended periods of standing and walking   Better with:  rest/activity avoidance   Treatments tried: takes Humira, rest, ice, compression sleeve, ibuprofen   Associated symptoms: swelling and stiffness  Orthopedic history: YES - Date: h/o RA- most recent left knee swelling 2020, treated with corticosteriod injection and aspiration   Relevant surgical history: NO  Social history: social history: works as RN tech at [employer], stated new workout routine recently     Past Medical History:   Diagnosis Date     Anxiety      Depressive disorder      Juvenile arthritis (H) 01/2004     Social History     Socioeconomic History     Marital status: Single   Tobacco Use     Smoking status: Never     Smokeless tobacco: Never   Substance and Sexual Activity     Alcohol use: Yes     Drug use: Not Currently         Patient's past medical, surgical, social, and family histories were reviewed today and no changes are noted.    REVIEW OF SYSTEMS:  10 point ROS is negative other than symptoms noted above in HPI, Past Medical History or as stated below  Constitutional: NEGATIVE for fever, chills, change in weight  Skin: NEGATIVE for worrisome rashes, moles or lesions  GI/: NEGATIVE for bowel or bladder changes  Neuro: NEGATIVE for weakness, dizziness or paresthesias    OBJECTIVE:  /77   Wt 62.6 kg (138 lb)   BMI 24.45 kg/m     General: healthy, alert and in no distress  HEENT: no scleral icterus or conjunctival erythema  Skin: no suspicious lesions or rash. No jaundice.  CV: no pedal edema  Resp: normal respiratory effort without conversational dyspnea   Psych: normal mood and affect  Gait: normal steady gait with appropriate coordination and balance  Neuro: Normal light sensory exam of lower extremity  MSK:  LEFT KNEE  Inspection:    normal alignment  Palpation:    Tender about the lateral joint line and medial joint line. Remainder of bony and ligamentous landmarks are nontender.    Large effusion is present    Patellofemoral crepitus is Absent  Range of Motion:      00 extension to 1200 flexion  Strength:    Quadriceps grossly intact    Extensor mechanism intact  Special Tests:    Positive: none    Negative: MCL/valgus stress (0 & 30 deg), LCL/varus stress (0 & 30 deg), Lachman's, anterior drawer, posterior drawer, Lola's    Independent visualization of the below image:  No results found for this or any previous visit (from the past 24 hour(s)).    Large Joint Injection/Arthocentesis: L knee joint    Date/Time: 11/28/2022 3:44 PM  Performed by: Yeo, Albert, MD  Authorized by: Yeo, Albert, MD     Indications:  Pain and osteoarthritis  Needle Size:  22 G  Guidance: ultrasound    Approach:  Lateral  Location:  Knee      Medications:  40 mg methylPREDNISolone 40 MG/ML  Aspirate amount (mL):  71  Aspirate:  Yellow and serous  Aspirate analysis: sent for lab analysis    Outcome:  Tolerated well, no immediate complications  Procedure discussed: discussed risks, benefits, and alternatives    Consent Given by:  Patient  Prep: patient was prepped and draped in usual sterile fashion          Patient's conditions were thoroughly discussed during today's visit with total time spent face-to-face with the patient and documentation being 45 minutes.    Albert Yeo MD [facility] [place]

## 2022-11-28 ENCOUNTER — OFFICE VISIT (OUTPATIENT)
Dept: ORTHOPEDICS | Facility: CLINIC | Age: 22
End: 2022-11-28
Attending: INTERNAL MEDICINE
Payer: COMMERCIAL

## 2022-11-28 VITALS — WEIGHT: 138 LBS | DIASTOLIC BLOOD PRESSURE: 77 MMHG | SYSTOLIC BLOOD PRESSURE: 118 MMHG | BODY MASS INDEX: 24.45 KG/M2

## 2022-11-28 DIAGNOSIS — M08.90 CHRONIC ARTHRITIS OF JUVENILE ONSET (H): ICD-10-CM

## 2022-11-28 DIAGNOSIS — M25.462 EFFUSION, LEFT KNEE: ICD-10-CM

## 2022-11-28 LAB
CRYSTALS SNV MICRO: NORMAL
LYMPHOCYTES NFR FLD MANUAL: 76 %
MONOS+MACROS NFR FLD MANUAL: 9 %
NEUTS BAND NFR FLD MANUAL: 15 %

## 2022-11-28 PROCEDURE — 20611 DRAIN/INJ JOINT/BURSA W/US: CPT | Mod: LT | Performed by: FAMILY MEDICINE

## 2022-11-28 PROCEDURE — 87070 CULTURE OTHR SPECIMN AEROBIC: CPT | Performed by: FAMILY MEDICINE

## 2022-11-28 PROCEDURE — 87205 SMEAR GRAM STAIN: CPT | Performed by: FAMILY MEDICINE

## 2022-11-28 PROCEDURE — 89060 EXAM SYNOVIAL FLUID CRYSTALS: CPT | Performed by: FAMILY MEDICINE

## 2022-11-28 PROCEDURE — 99204 OFFICE O/P NEW MOD 45 MIN: CPT | Mod: 25 | Performed by: FAMILY MEDICINE

## 2022-11-28 PROCEDURE — 89051 BODY FLUID CELL COUNT: CPT | Performed by: FAMILY MEDICINE

## 2022-11-28 RX ORDER — METHYLPREDNISOLONE ACETATE 40 MG/ML
40 INJECTION, SUSPENSION INTRA-ARTICULAR; INTRALESIONAL; INTRAMUSCULAR; SOFT TISSUE
Status: SHIPPED | OUTPATIENT
Start: 2022-11-28

## 2022-11-28 RX ADMIN — METHYLPREDNISOLONE ACETATE 40 MG: 40 INJECTION, SUSPENSION INTRA-ARTICULAR; INTRALESIONAL; INTRAMUSCULAR; SOFT TISSUE at 15:44

## 2022-11-28 NOTE — PATIENT INSTRUCTIONS
1. Effusion, left knee    2. Chronic arthritis of juvenile onset (H)      -Patient has chronic left knee pain and swelling due to juvenile rheumatoid arthritis  -Patient tolerated aspiration and cortisone injection today without complications.  Patient was given postprocedure instruction.  Synovial fluid was sent for analysis including cell count with differential, crystals, gram stain and fluid culture.  -We had a long in-depth discussion regarding patient's lifelong history of juvenile rheumatoid arthritis including treatments. Patient reports her last left knee intra-articular cortisone injection was 2 years ago.  She believes she had 2 other cortisone injections in the previous years and an additional 1 when she was 4 years old.  Patient was educated that repeat cortisone injections can cause damage to the bones including avascular necrosis, increased risk for tendon injuries and cartilage damage.  -Patient will start formal physical therapy and home exercise program to strengthen and stabilize her leg and knee  -Patient is following up with her rheumatologist next week for further medical management for her rheumatoid arthritis  -Call direct clinic number [[phone]] at any time with questions or concerns.    Albert Yeo MD CAQSM  [facility]  [facility] [place]

## 2022-11-28 NOTE — LETTER
11/28/2022         RE: Shabnam Yu  [id number] [facility] [address]        Dear Colleague,    Thank you for referring your patient, Shabnam Yu, to the [facility] [place]. Please see a copy of my visit note below.    ASSESSMENT & PLAN  Patient Instructions     1. Effusion, left knee    2. Chronic arthritis of juvenile onset (H)      -Patient has chronic left knee pain and swelling due to juvenile rheumatoid arthritis  -Patient tolerated aspiration and cortisone injection today without complications.  Patient was given postprocedure instruction.  Synovial fluid was sent for analysis including cell count with differential, crystals, gram stain and fluid culture.  -We had a long in-depth discussion regarding patient's lifelong history of juvenile rheumatoid arthritis including treatments. Patient reports her last left knee intra-articular cortisone injection was 2 years ago.  She believes she had 2 other cortisone injections in the previous years and an additional 1 when she was 4 years old.  Patient was educated that repeat cortisone injections can cause damage to the bones including avascular necrosis, increased risk for tendon injuries and cartilage damage.  -Patient will start formal physical therapy and home exercise program to strengthen and stabilize her leg and knee  -Patient is following up with her rheumatologist next week for further medical management for her rheumatoid arthritis  -Call direct clinic number [[phone]] at any time with questions or concerns.    Albert Yeo MD [facility]  [facility]          -----    SUBJECTIVE  Shabnam Yu is a/an 22 year old female who is seen in consultation at the request of  Quang Keith M.D. for evaluation of left knee pain. The patient is seen by themselves.    Onset: 1 month(s) ago. Patient denies injury, states she did a new hard  exercise workout the day after a Halloween party. Notes increased swelling since   Location of Pain: left entire knee swelling   Rating of Pain at worst: 6/10  Rating of Pain Currently: 6/10  Worsened by: any exercise/extended periods of standing and walking   Better with: rest/activity avoidance   Treatments tried: takes Humira, rest, ice, compression sleeve, ibuprofen   Associated symptoms: swelling and stiffness  Orthopedic history: YES - Date: h/o RA- most recent left knee swelling 2020, treated with corticosteriod injection and aspiration   Relevant surgical history: NO  Social history: social history: works as [employer] at [employer], stated new workout routine recently     Past Medical History:   Diagnosis Date     Anxiety      Depressive disorder      Juvenile arthritis (H) 01/2004     Social History     Socioeconomic History     Marital status: Single   Tobacco Use     Smoking status: Never     Smokeless tobacco: Never   Substance and Sexual Activity     Alcohol use: Yes     Drug use: Not Currently         Patient's past medical, surgical, social, and family histories were reviewed today and no changes are noted.    REVIEW OF SYSTEMS:  10 point ROS is negative other than symptoms noted above in HPI, Past Medical History or as stated below  Constitutional: NEGATIVE for fever, chills, change in weight  Skin: NEGATIVE for worrisome rashes, moles or lesions  GI/: NEGATIVE for bowel or bladder changes  Neuro: NEGATIVE for weakness, dizziness or paresthesias    OBJECTIVE:  /77   Wt 62.6 kg (138 lb)   BMI 24.45 kg/m     General: healthy, alert and in no distress  HEENT: no scleral icterus or conjunctival erythema  Skin: no suspicious lesions or rash. No jaundice.  CV: no pedal edema  Resp: normal respiratory effort without conversational dyspnea   Psych: normal mood and affect  Gait: normal steady gait with appropriate coordination and balance  Neuro: Normal light sensory exam of lower  extremity  MSK:  LEFT KNEE  Inspection:    normal alignment  Palpation:    Tender about the lateral joint line and medial joint line. Remainder of bony and ligamentous landmarks are nontender.    Large effusion is present    Patellofemoral crepitus is Absent  Range of Motion:     00 extension to 1200 flexion  Strength:    Quadriceps grossly intact    Extensor mechanism intact  Special Tests:    Positive: none    Negative: MCL/valgus stress (0 & 30 deg), LCL/varus stress (0 & 30 deg), Lachman's, anterior drawer, posterior drawer, Lola's    Independent visualization of the below image:  No results found for this or any previous visit (from the past 24 hour(s)).    Large Joint Injection/Arthocentesis: L knee joint    Date/Time: 11/28/2022 3:44 PM  Performed by: Yeo, Albert, MD  Authorized by: Yeo, Albert, MD     Indications:  Pain and osteoarthritis  Needle Size:  22 G  Guidance: ultrasound    Approach:  Lateral  Location:  Knee      Medications:  40 mg methylPREDNISolone 40 MG/ML  Aspirate amount (mL):  71  Aspirate:  Yellow and serous  Aspirate analysis: sent for lab analysis    Outcome:  Tolerated well, no immediate complications  Procedure discussed: discussed risks, benefits, and alternatives    Consent Given by:  Patient  Prep: patient was prepped and draped in usual sterile fashion          Patient's conditions were thoroughly discussed during today's visit with total time spent face-to-face with the patient and documentation being 45 minutes.    Albert Yeo MD [facility]

## 2022-11-29 ENCOUNTER — TELEPHONE (OUTPATIENT)
Dept: RHEUMATOLOGY | Facility: CLINIC | Age: 22
End: 2022-11-29

## 2022-11-29 LAB
APPEARANCE FLD: ABNORMAL
CELL COUNT BODY FLUID SOURCE: ABNORMAL
COLOR FLD: YELLOW
WBC # FLD AUTO: 5164 /UL

## 2022-11-29 NOTE — TELEPHONE ENCOUNTER
Patient left message for this RN, returned call, needed to leave message.  Will also send MyChart message.  Patient wanted clarification if she could administer her adalimumab (Humira) , she received a joint injection yesterday. Confirmed this would be ok.    Celi De La Rosa, BSN, RN  RN Care Coordinator Rheumatology

## 2022-12-02 ENCOUNTER — TELEPHONE (OUTPATIENT)
Dept: RHEUMATOLOGY | Facility: CLINIC | Age: 22
End: 2022-12-02

## 2022-12-02 NOTE — TELEPHONE ENCOUNTER
Follow-up call to patient after appointment cancelled today due to Dr. Keith's unplanned absence.  Shabnam really feels like her disease is not managed well right now. She is concerned with the amount of fluid aspirated out of her knee and this flare being the worst flare she has ever experienced.   Patient feels strongly that she needs to see Dr. Keith. Patient was re-scheduled for July.    Will discuss with Dr. Keith and follow-up with patient next week.    WILLIAM BenavidezN, RN  RN Care Coordinator Rheumatology

## 2022-12-03 LAB
BACTERIA SNV CULT: NO GROWTH
GRAM STAIN RESULT: NORMAL
GRAM STAIN RESULT: NORMAL

## 2022-12-05 ENCOUNTER — TELEPHONE (OUTPATIENT)
Dept: RHEUMATOLOGY | Facility: CLINIC | Age: 22
End: 2022-12-05

## 2022-12-05 ENCOUNTER — MYC MEDICAL ADVICE (OUTPATIENT)
Dept: RHEUMATOLOGY | Facility: CLINIC | Age: 22
End: 2022-12-05

## 2022-12-05 NOTE — TELEPHONE ENCOUNTER
Call to patient to offer an [place] tomorrow at 0915 [place] [facility] Discussed her MyChart message and that he will address her concerns tomorrow.    All questions answered.    WILLIAM BenavidezN, RN  RN Care Coordinator Rheumatology

## 2022-12-05 NOTE — PROGRESS NOTES
"  Rheumatology Clinic     Shabnam Yu MRN# [id number]   YOB: 2000 Age: 22 year old     Date of Visit: 12/06/2022  Primary care provider: Bebe Bartlett          Assessment and Plan:     # Recurrent oligoarticular inflammatory arthritis:   Patient reports fairly abrupt onset left knee swelling, pain, stiffness in late October 2022.  Video examination today shows moderate to large effusion at the left knee without redness.  There is self tenderness diffusely around the kneecap and at the joint lines., and reduced knee flexion capacity.  Weightbearing is not impaired. laboratory evaluation on September 2022 showed creatinine and CBC normal; CRP was mildly elevated at 10.3.    The cause of of increased left knee swelling/pain remains uncertain.  Patient describes onset of pain after a particularly vigorous aerobic workout involving the lower extremities, but the steady progression of pain and swelling over the course of 3 to 4 weeks after the purported inciting event points away from internal derangement such as a torn meniscus, ligamentous tear, or cartilage defect.  On the other hand, patient has been using antirheumatic medication regularly and on schedule, and has had no other symptoms or signs to suggest activity of the juvenile inflammatory arthritis with monoarticular symptoms.      Synovial fluid analysis from November 28 left knee aspiration shows a white count of approximately 5000, on the borderline of cell counts that would be associated with \"inflammatory\" and noninflammatory causes.  Because patient has had limited improvement now 7 days after aspiration injection of the knee, I recommend going ahead with additional imaging to distinguish structural versus inflammatory possibilities.  I recommend MRI without contrast.    I recommend continuing Humira 40 mg subcu every other week for the time being. Consider adding back sulfasalazine or methotrexate prn worsening " monoarthritis that does not have explanation with respect to structural disease.    # Dry eyes: Dry eye symptoms seem tightly associated with use of long duration contact.  I recommend follow-up with eye provider for repeat examination.  I have low suspicion of association with rheumatic disease.    Plan:  1.  Continue Humira 40 mg subcutaneous every other week.  3.  Celebrex 100 mg 1 tab up to twice daily as needed for knee stiffness and swelling; use acetaminophen 1000 mg 3 times a day around-the-clock until MRI for pain relief.  4.  Check kidney function and blood counts in 4-2022  5.  MRI left knee with contrast; follow-up with sports medicine if MRI shows internal derangement rather than changes associated with juvenile arthritis.    Follow-up MRI results by phone, but RTC 6 months.    Quang Keith MD  Staff Rheumatologist, [facility]       On the day of the encounter, a total of 30 minutes was spent in chart review, and in counseling and coordination of care, regarding the patient's complex medical problem of increasing left knee symptoms in a setting of juvenile onset inflammatory oligoarthritis.         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 She was last seen 5-2022.  Humira was continued for well-controlled inflammatory arthritis.    Interval history December 6, 2022    On 10-28 2022, she did an intense workout with cardio. She noted pain after the workout in many areas. Throughout the night, she had increasing pain in her left knee. She then had increased swelling in the knee over ensuing weeks. It prevented her from normal walking; she could not work out at the gym.    Patient was seen in sports medicine in November 28, 2022.  Impression was  "of chronic juvenile onset arthritis with recurrent left knee effusion.  History of prior intra-articular injections in the left knee was reviewed with the conclusion that the most recent injection has been performed over 2 years prior to the appointment.  Recommendation was to make a trial of physical therapy and home exercise program.  Aspiration and injection of the left knee was performed.    She had injection a week ago. Pain and swelling have since then improved, but when walking around, she has had some L knee pain.   However yesterday, she woke up with \"inflammation\" in the knee with stiffness,  Decreased walking ability. No redness, + warmth,  +modest tenderness around the knee.    No other joint symptoms.  No fevers  She noted a rash, starting in the Fall, none for a few weeks, red splotches on face and neck; saw Derm; she was told \"dermatitis\" allergic reaction possibly to skn care product.  +dry eyes for 6 months; difficulty to wear contacts, she will switch to shorter duration contacts. She stopped taking sertraline; she lost weight, and drinks water/coffee.    She has been using humira 40 mg every other week and describes strict adherence with med since early Fall.    Interval history May 27, 2022    She developed fever, fatigue, weakness, and ST, sinus congestion last week. She was tested for viral pathogens, all negative. She used fluids, rest, and acetaminophen; fever has broken, but ST persists.    Before the recent illness, she had been exercising regularly with gym workouts or 3-4 mile walks. Minimal joint pain. She reports noJIA flareups in months, and she feels much more confident about exercise without inducing flare.     She notes occasional knee discomfort for a few days before scheduled humira injections. She has been regular with using humira. Still has red/puffy at site lasting 1-2 days, occasionally bruising.    She will start new job at [employer]    Interval history 10-:    Other " than recent cold, health is good. Her knees have not flared recently. She is using humira regularly. She does note warm/redness at site after injection, lasting less than 24 hours.   Occasionally she notes slight extra stiffness or discomfort in the lead up to scheduled humira dosing. No early morning stiffness. She does not use NSAIDs for joint pain.   Time constraints have limited aerobic exercise, but walking extenstively does not exacerbate knee or joint pain.    she is active socially and athletically.  She is carrying a 15 credit load in college.    Background hx 8-, transition to adult care:  At the age of 4 patient developed problems with swelling in her knees and hands. Her parents noted alteration in her knee anatomy during soccer practice. She ultimately was diagnosed as having juvenile rheumatoid arthritis with many involved joints (fingers, toes, and knees) and saw Dr. MORALES Silva, a pediatric rheumatologist at [facility]. She was treated with methotrexate and at the age of 11 went into remission. At age 12, she had stopped medication, but her left knee remained swollen. She had an injection in the left knee.    Since then, she reports several instances of swelling and pain in her knee. Those were short lived. In late 2019 she began having recurrent difficulties with pain and swelling in the left knee.  She saw Dr. Mckenzie in rheumatology at [facility].  Flare of inflammatory arthritis in the left knee was diagnosed, and the knee was aspirated and injected.  She saw Dr. Mckenzie in follow-up virtually on Patito 3, 2020.  At that time excellent and durable response to   left knee injections was reported, but the left knee had again become swollen, stiff, and painful.  At that point, the plan was to use scheduled naproxen and to start sulfasalazine.  In July, she followed up with Dr. Mckenzie, and reported poor tolerance of naproxen and intermittent continued left knee  "swelling and pain.  Dr. Mckenzie recommended advancing the dose of sulfasalazine.    On July 11, the L knee again swelling, and was aspirated and injected again. All the swelling has since disappeared. She does not have pain on a daily basis. She has been hiking and canoeing without difficulty. Somestimes, the left knee feels \"off\" compared to the R, but no swelling/loss of ROM. No redness/warmth.    She has had no problems with pain or swelling in joints elsewhere recently with the exception of pain in the right TMJ region.    In the recent past she has been using 600 mg twice daily of ibuprofen. She developed stomach upset and chest pain. She stopped it and that has improved. Sulfasalazine has also been difficult to take because of heartburn, and she has taken it only occasionally at reduced doses since visiting with Dr. Mckenzie.    She has been under increasing stress at school. She has had no history of psoriasis or photosensitivity. There is no history of iritis or uveitis, dry eyes or dry mouth, she has had no problems with pleurisy or asthma. There is no history of stomach ulcers or inflammatory bowel disease. She has had no problems with kidney or liver disease, diabetes or thyroid disease, cytopenias or neurologic disease. There is no history of hypertension.            Review of Systems:       Constitutional: none  Skin: neg  Eyes: no eye pain or vision change  Ears/Nose/Throat: negative  Respiratory: she has SOB, associated with anxiety  Cardiovascular: negative  Gastrointestinal: + GERD symptoms, stable  Genitourinary: negative  Musculoskeletal: negative  Neurologic: negative  Psychiatric: negative  Hematologic/Lymphatic/Immunologic: neg  Endocrine: negative          Past Medical History:     Past Medical History:   Diagnosis Date     Anxiety      Depressive disorder      Juvenile arthritis (H) 01/2004     Past Surgical History:   Procedure Laterality Date     CARDIAC SURGERY N/A 01/2004     # " congenital heart disease: septal defect which was treated with a coiling procedure at age 4.  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  Activity: She walks regularly         Family History:     Family History   Problem Relation Age of Onset     Cancer Maternal Grandmother      Cancer Paternal Grandmother      Cancer Paternal Grandfather      Family History: Negative with respect to inflammatory disease.       Allergies:   No Known Allergies         Medications:     Current Outpatient Medications   Medication Sig Dispense Refill     HUMIRA *CF* PEN 40 MG/0.4ML pen kit INJECT THE CONTENTS OF 1 AUTOINJECTOR PEN UNDER THE SKIN EVERY OTHER WEEK HOLD FOR SIGNS OF INFECTION THEN SEEK MEDICAL ATTENTION 2 each 6     norgestimate-ethinyl estradiol (ORTHO-CYCLEN) 0.25-35 MG-MCG tablet Take 1 tablet by mouth       diphenhydrAMINE-zinc acetate (BENADRYL) 2-0.1 % external cream Apply topically 3 times daily as needed for itching (Patient not taking: Reported on 12/6/2022) 28 g 1     sertraline (ZOLOFT) 50 MG tablet Take 50 mg by mouth daily (Patient not taking: Reported on 12/6/2022)              Physical Exam:   There were no vitals taken for this visit.  Wt Readings from Last 4 Encounters:   11/28/22 62.6 kg (138 lb)   05/27/22 64.5 kg (142 lb 3.2 oz)   04/27/21 65.2 kg (143 lb 12.8 oz)   09/29/20 55.1 kg (121 lb 8 oz)       Constitutional: well-developed, appearing stated age; cooperative  Eyes: nl EOM, PERRLA, vision, conjunctiva, sclera  ENT: nl external ears, nose, hearing, lips, teeth, gums, throat  No mucous membrane lesions, normal saliva  pool  Neck: no mass or thyroid enlargement  Resp: breathing unlabored  MS:  L knee shows large effusion with self tenderness and decreased knee flexion.  Weightbearing is not painful..  Skin: no nail pitting, alopecia, rash, nodules or lesions  Neuro: nl cranial nerves, strength, sensation, DTRs.   Psych: nl judgement, orientation, memory, affect.         Data:     RHEUM RESULTS Latest Ref Rng & Units 11/13/2008 2/5/2009 5/16/2009   ALBUMIN 3.4 - 5.0 g/dL 4.5 - 4.9   ALT 0 - 50 U/L 20 8 11   AST 0 - 45 U/L 39 - -   CREATININE 0.52 - 1.04 mg/dL 0.51 0.49 0.54   CRP <5.00 mg/L - - -   GFR ESTIMATE, IF BLACK >60 mL/min/[1.73:m2] GFR not calculated, patient <16 years old. GFR not calculated, patient <16 years old. GFR not calculated, patient <16 years old.   GFR ESTIMATE >60 mL/min/1.73m2 GFR not calculated, patient <16 years old. GFR not calculated, patient <16 years old. GFR not calculated, patient <16 years old.   HEMATOCRIT 35.0 - 47.0 % 37.9 39.7 37.2   HEMOGLOBIN 11.7 - 15.7 g/dL 13.5 13.9 13.3   HEPBANG NR:Nonreactive - - -   HCVAB NR:Nonreactive - - -   WBC 4.0 - 11.0 10e3/uL 4.4(L) 4.2(L) 4.4(L)   RBC 3.80 - 5.20 10e6/uL 4.54 4.54 4.32   RDW 10.0 - 15.0 % 13.2 12.9 12.3   MCHC 31.5 - 36.5 g/dL 35.6 35.0 35.8   MCV 78 - 100 fL 84 87 86   PLATELET COUNT 150 - 450 10e3/uL 245 258 227   ESR 0 - 15 mm/h 20(H) - -   QUANTIFERON-TB GOLD PLUS RESULT Negative - - -

## 2022-12-05 NOTE — TELEPHONE ENCOUNTER
Have spoken with patient on phone, scheduled for video visit with Dr. Keith tomorrow.    Celi De La Rosa, BSN, RN  RN Care Coordinator Rheumatology

## 2022-12-05 NOTE — TELEPHONE ENCOUNTER
Multiple calls between this RN and patient.   Confirmed video visit for tomorrow at 0945.    All questions answered.    JESSICA Benavidez, RN  RN Care Coordinator Rheumatology

## 2022-12-06 ENCOUNTER — VIRTUAL VISIT (OUTPATIENT)
Dept: RHEUMATOLOGY | Facility: CLINIC | Age: 22
End: 2022-12-06
Attending: INTERNAL MEDICINE
Payer: COMMERCIAL

## 2022-12-06 DIAGNOSIS — M08.90 CHRONIC ARTHRITIS OF JUVENILE ONSET (H): ICD-10-CM

## 2022-12-06 DIAGNOSIS — M25.462 EFFUSION, LEFT KNEE: Primary | ICD-10-CM

## 2022-12-06 PROCEDURE — 99214 OFFICE O/P EST MOD 30 MIN: CPT | Mod: GT | Performed by: INTERNAL MEDICINE

## 2022-12-06 RX ORDER — CELECOXIB 100 MG/1
CAPSULE ORAL
Qty: 60 CAPSULE | Refills: 1 | Status: SHIPPED | OUTPATIENT
Start: 2022-12-06

## 2022-12-06 RX ORDER — ADALIMUMAB 40MG/0.4ML
KIT SUBCUTANEOUS
Qty: 2 EACH | Refills: 6 | Status: SHIPPED | OUTPATIENT
Start: 2022-12-06 | End: 2022-12-15

## 2022-12-06 NOTE — LETTER
"12/6/2022       RE: Shabnam Yu  [address] [facility] [address]     Dear Colleague,    Thank you for referring your patient, Shabnam Yu, to the [facility] [place] [facility]. Please see a copy of my visit note below.      Rheumatology Clinic     Shabnam Yu MRN# [id number]   YOB: 2000 Age: 22 year old     Date of Visit: 12/06/2022  Primary care provider: Bebe Bartlett          Assessment and Plan:     # Recurrent oligoarticular inflammatory arthritis:   Patient reports fairly abrupt onset left knee swelling, pain, stiffness in late October 2022.  Video examination today shows moderate to large effusion at the left knee without redness.  There is self tenderness diffusely around the kneecap and at the joint lines., and reduced knee flexion capacity.  Weightbearing is not impaired. laboratory evaluation on September 2022 showed creatinine and CBC normal; CRP was mildly elevated at 10.3.    The cause of of increased left knee swelling/pain remains uncertain.  Patient describes onset of pain after a particularly vigorous aerobic workout involving the lower extremities, but the steady progression of pain and swelling over the course of 3 to 4 weeks after the purported inciting event points away from internal derangement such as a torn meniscus, ligamentous tear, or cartilage defect.  On the other hand, patient has been using antirheumatic medication regularly and on schedule, and has had no other symptoms or signs to suggest activity of the juvenile inflammatory arthritis with monoarticular symptoms.      Synovial fluid analysis from November 28 left knee aspiration shows a white count of approximately 5000, on the borderline of cell counts that would be associated with \"inflammatory\" and noninflammatory causes.  Because patient has had limited improvement now 7 days after " aspiration injection of the knee, I recommend going ahead with additional imaging to distinguish structural versus inflammatory possibilities.  I recommend MRI without contrast.    I recommend continuing Humira 40 mg subcu every other week for the time being. Consider adding back sulfasalazine or methotrexate prn worsening monoarthritis that does not have explanation with respect to structural disease.    # Dry eyes: Dry eye symptoms seem tightly associated with use of long duration contact.  I recommend follow-up with eye provider for repeat examination.  I have low suspicion of association with rheumatic disease.    Plan:  1.  Continue Humira 40 mg subcutaneous every other week.  3.  Celebrex 100 mg 1 tab up to twice daily as needed for knee stiffness and swelling; use acetaminophen 1000 mg 3 times a day around-the-clock until MRI for pain relief.  4.  Check kidney function and blood counts in 4-2022  5.  MRI left knee with contrast; follow-up with sports medicine if MRI shows internal derangement rather than changes associated with juvenile arthritis.    Follow-up MRI results by phone, but RTC 6 months.    Quang Keith MD  Staff Rheumatologist, [facility]       On the day of the encounter, a total of 30 minutes was spent in chart review, and in counseling and coordination of care, regarding the patient's complex medical problem of increasing left knee symptoms in a setting of juvenile onset inflammatory oligoarthritis.         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 She was last seen 5-2022.  Humira was continued for well-controlled inflammatory arthritis.    Interval history December 6, 2022    On 10-28 2022, she did an intense workout with cardio.  "She noted pain after the workout in many areas. Throughout the night, she had increasing pain in her left knee. She then had increased swelling in the knee over ensuing weeks. It prevented her from normal walking; she could not work out at the gym.    Patient was seen in sports medicine in November 28, 2022.  Impression was of chronic juvenile onset arthritis with recurrent left knee effusion.  History of prior intra-articular injections in the left knee was reviewed with the conclusion that the most recent injection has been performed over 2 years prior to the appointment.  Recommendation was to make a trial of physical therapy and home exercise program.  Aspiration and injection of the left knee was performed.    She had injection a week ago. Pain and swelling have since then improved, but when walking around, she has had some L knee pain.   However yesterday, she woke up with \"inflammation\" in the knee with stiffness,  Decreased walking ability. No redness, + warmth,  +modest tenderness around the knee.    No other joint symptoms.  No fevers  She noted a rash, starting in the Fall, none for a few weeks, red splotches on face and neck; saw Derm; she was told \"dermatitis\" allergic reaction possibly to skn care product.  +dry eyes for 6 months; difficulty to wear contacts, she will switch to shorter duration contacts. She stopped taking sertraline; she lost weight, and drinks water/coffee.    She has been using humira 40 mg every other week and describes strict adherence with med since early Fall.    Interval history May 27, 2022    She developed fever, fatigue, weakness, and ST, sinus congestion last week. She was tested for viral pathogens, all negative. She used fluids, rest, and acetaminophen; fever has broken, but ST persists.    Before the recent illness, she had been exercising regularly with gym workouts or 3-4 mile walks. Minimal joint pain. She reports noJIA flareups in months, and she feels much more " confident about exercise without inducing flare.     She notes occasional knee discomfort for a few days before scheduled humira injections. She has been regular with using humira. Still has red/puffy at site lasting 1-2 days, occasionally bruising.    She will start new job at [employer]    Interval history 10-:    Other than recent cold, health is good. Her knees have not flared recently. She is using humira regularly. She does note warm/redness at site after injection, lasting less than 24 hours.   Occasionally she notes slight extra stiffness or discomfort in the lead up to scheduled humira dosing. No early morning stiffness. She does not use NSAIDs for joint pain.   Time constraints have limited aerobic exercise, but walking extenstively does not exacerbate knee or joint pain.    she is active socially and athletically.  She is carrying a 15 credit load in college.    Background hx 8-, transition to adult care:  At the age of 4 patient developed problems with swelling in her knees and hands. Her parents noted alteration in her knee anatomy during soccer practice. She ultimately was diagnosed as having juvenile rheumatoid arthritis with many involved joints (fingers, toes, and knees) and saw Dr. MORALES Silva, a pediatric rheumatologist at [facility]. She was treated with methotrexate and at the age of 11 went into remission. At age 12, she had stopped medication, but her left knee remained swollen. She had an injection in the left knee.    Since then, she reports several instances of swelling and pain in her knee. Those were short lived. In late 2019 she began having recurrent difficulties with pain and swelling in the left knee.  She saw Dr. Mckenzie in rheumatology at [facility].  Flare of inflammatory arthritis in the left knee was diagnosed, and the knee was aspirated and injected.  She saw Dr. Mckenzie in follow-up virtually on Patito 3, 2020.  At that time excellent and durable  "response to   left knee injections was reported, but the left knee had again become swollen, stiff, and painful.  At that point, the plan was to use scheduled naproxen and to start sulfasalazine.  In July, she followed up with Dr. Mckenzie, and reported poor tolerance of naproxen and intermittent continued left knee swelling and pain.  Dr. Mckenzie recommended advancing the dose of sulfasalazine.    On July 11, the L knee again swelling, and was aspirated and injected again. All the swelling has since disappeared. She does not have pain on a daily basis. She has been hiking and canoeing without difficulty. Somestimes, the left knee feels \"off\" compared to the R, but no swelling/loss of ROM. No redness/warmth.    She has had no problems with pain or swelling in joints elsewhere recently with the exception of pain in the right TMJ region.    In the recent past she has been using 600 mg twice daily of ibuprofen. She developed stomach upset and chest pain. She stopped it and that has improved. Sulfasalazine has also been difficult to take because of heartburn, and she has taken it only occasionally at reduced doses since visiting with Dr. Mckenzie.    She has been under increasing stress at school. She has had no history of psoriasis or photosensitivity. There is no history of iritis or uveitis, dry eyes or dry mouth, she has had no problems with pleurisy or asthma. There is no history of stomach ulcers or inflammatory bowel disease. She has had no problems with kidney or liver disease, diabetes or thyroid disease, cytopenias or neurologic disease. There is no history of hypertension.            Review of Systems:       Constitutional: none  Skin: neg  Eyes: no eye pain or vision change  Ears/Nose/Throat: negative  Respiratory: she has SOB, associated with anxiety  Cardiovascular: negative  Gastrointestinal: + GERD symptoms, stable  Genitourinary: negative  Musculoskeletal: negative  Neurologic: " negative  Psychiatric: negative  Hematologic/Lymphatic/Immunologic: neg  Endocrine: negative          Past Medical History:     Past Medical History:   Diagnosis Date     Anxiety      Depressive disorder      Juvenile arthritis (H) 01/2004     Past Surgical History:   Procedure Laterality Date     CARDIAC SURGERY N/A 01/2004     # congenital heart disease: septal defect which was treated with a coiling procedure at age 4.  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  Activity: She walks regularly         Family History:     Family History   Problem Relation Age of Onset     Cancer Maternal Grandmother      Cancer Paternal Grandmother      Cancer Paternal Grandfather      Family History: Negative with respect to inflammatory disease.       Allergies:   No Known Allergies         Medications:     Current Outpatient Medications   Medication Sig Dispense Refill     HUMIRA *CF* PEN 40 MG/0.4ML pen kit INJECT THE CONTENTS OF 1 AUTOINJECTOR PEN UNDER THE SKIN EVERY OTHER WEEK HOLD FOR SIGNS OF INFECTION THEN SEEK MEDICAL ATTENTION 2 each 6     norgestimate-ethinyl estradiol (ORTHO-CYCLEN) 0.25-35 MG-MCG tablet Take 1 tablet by mouth       diphenhydrAMINE-zinc acetate (BENADRYL) 2-0.1 % external cream Apply topically 3 times daily as needed for itching (Patient not taking: Reported on 12/6/2022) 28 g 1     sertraline (ZOLOFT) 50 MG tablet Take 50 mg by mouth daily (Patient not taking: Reported on 12/6/2022)              Physical Exam:   There were no vitals taken for this visit.  Wt Readings from Last 4 Encounters:   11/28/22 62.6 kg (138 lb)    05/27/22 64.5 kg (142 lb 3.2 oz)   04/27/21 65.2 kg (143 lb 12.8 oz)   09/29/20 55.1 kg (121 lb 8 oz)       Constitutional: well-developed, appearing stated age; cooperative  Eyes: nl EOM, PERRLA, vision, conjunctiva, sclera  ENT: nl external ears, nose, hearing, lips, teeth, gums, throat  No mucous membrane lesions, normal saliva pool  Neck: no mass or thyroid enlargement  Resp: breathing unlabored  MS:  L knee shows large effusion with self tenderness and decreased knee flexion.  Weightbearing is not painful..  Skin: no nail pitting, alopecia, rash, nodules or lesions  Neuro: nl cranial nerves, strength, sensation, DTRs.   Psych: nl judgement, orientation, memory, affect.         Data:     RHEUM RESULTS Latest Ref Rng & Units 11/13/2008 2/5/2009 5/16/2009   ALBUMIN 3.4 - 5.0 g/dL 4.5 - 4.9   ALT 0 - 50 U/L 20 8 11   AST 0 - 45 U/L 39 - -   CREATININE 0.52 - 1.04 mg/dL 0.51 0.49 0.54   CRP <5.00 mg/L - - -   GFR ESTIMATE, IF BLACK >60 mL/min/[1.73:m2] GFR not calculated, patient <16 years old. GFR not calculated, patient <16 years old. GFR not calculated, patient <16 years old.   GFR ESTIMATE >60 mL/min/1.73m2 GFR not calculated, patient <16 years old. GFR not calculated, patient <16 years old. GFR not calculated, patient <16 years old.   HEMATOCRIT 35.0 - 47.0 % 37.9 39.7 37.2   HEMOGLOBIN 11.7 - 15.7 g/dL 13.5 13.9 13.3   HEPBANG NR:Nonreactive - - -   HCVAB NR:Nonreactive - - -   WBC 4.0 - 11.0 10e3/uL 4.4(L) 4.2(L) 4.4(L)   RBC 3.80 - 5.20 10e6/uL 4.54 4.54 4.32   RDW 10.0 - 15.0 % 13.2 12.9 12.3   MCHC 31.5 - 36.5 g/dL 35.6 35.0 35.8   MCV 78 - 100 fL 84 87 86   PLATELET COUNT 150 - 450 10e3/uL 245 258 227   ESR 0 - 15 mm/h 20(H) - -   QUANTIFERON-TB GOLD PLUS RESULT Negative - - -       Again, thank you for allowing me to participate in the care of your patient.      Sincerely,    Quang Keith MD

## 2022-12-06 NOTE — PROGRESS NOTES
Shabnam is a 22 year old who is being evaluated via a billable video visit.      How would you like to obtain your AVS? MyChart  If the video visit is dropped, the invitation should be resent by: Text to cell phone: [phone] (Text link sent to patient during rooming process for patient to join video. Patient confirmed she received link).  Will anyone else be joining your video visit? Patient's mother may join in with patient.        Video-Visit Details    Video Start Time: 9:50 AM    Type of service:  Video Visit    Video End Time:10:15 AM    Originating Location (pt. Location): Home        Distant Location (provider location):  Off-site    Platform used for Video Visit: Twonq

## 2022-12-06 NOTE — PATIENT INSTRUCTIONS
Dx:  Left knee swelling/pain    Plan:  1.  Continue Humira 40 mg subcutaneous every other week.  3.  Celebrex 100 mg 1 tab up to twice daily as needed for knee stiffness and swelling; use acetaminophen 1000 mg 3 times a day around-the-clock until MRI for pain relief.  4.  Check kidney function and blood counts in 4-2022  5. MRI left knee

## 2022-12-07 ENCOUNTER — TELEPHONE (OUTPATIENT)
Dept: ORTHOPEDICS | Facility: CLINIC | Age: 22
End: 2022-12-07

## 2022-12-07 ENCOUNTER — OFFICE VISIT (OUTPATIENT)
Dept: ORTHOPEDICS | Facility: CLINIC | Age: 22
End: 2022-12-07
Payer: COMMERCIAL

## 2022-12-07 ENCOUNTER — TELEPHONE (OUTPATIENT)
Dept: RHEUMATOLOGY | Facility: CLINIC | Age: 22
End: 2022-12-07

## 2022-12-07 VITALS — BODY MASS INDEX: 24.45 KG/M2 | WEIGHT: 138 LBS | DIASTOLIC BLOOD PRESSURE: 74 MMHG | SYSTOLIC BLOOD PRESSURE: 104 MMHG

## 2022-12-07 DIAGNOSIS — M25.462 EFFUSION, LEFT KNEE: Primary | ICD-10-CM

## 2022-12-07 DIAGNOSIS — M25.562 ACUTE PAIN OF LEFT KNEE: ICD-10-CM

## 2022-12-07 LAB
APPEARANCE FLD: ABNORMAL
CELL COUNT BODY FLUID SOURCE: ABNORMAL
COLOR FLD: YELLOW
LYMPHOCYTES NFR FLD MANUAL: 32 %
MONOS+MACROS NFR FLD MANUAL: 14 %
NEUTS BAND NFR FLD MANUAL: 54 %
WBC # FLD AUTO: 2443 /UL

## 2022-12-07 PROCEDURE — 87070 CULTURE OTHR SPECIMN AEROBIC: CPT | Performed by: FAMILY MEDICINE

## 2022-12-07 PROCEDURE — 20611 DRAIN/INJ JOINT/BURSA W/US: CPT | Mod: LT | Performed by: FAMILY MEDICINE

## 2022-12-07 PROCEDURE — 87205 SMEAR GRAM STAIN: CPT | Performed by: FAMILY MEDICINE

## 2022-12-07 PROCEDURE — 89051 BODY FLUID CELL COUNT: CPT | Performed by: FAMILY MEDICINE

## 2022-12-07 PROCEDURE — 99213 OFFICE O/P EST LOW 20 MIN: CPT | Mod: 25 | Performed by: FAMILY MEDICINE

## 2022-12-07 RX ORDER — LIDOCAINE HYDROCHLORIDE 10 MG/ML
5 INJECTION, SOLUTION INFILTRATION; PERINEURAL
Status: SHIPPED | OUTPATIENT
Start: 2022-12-07

## 2022-12-07 RX ADMIN — LIDOCAINE HYDROCHLORIDE 5 ML: 10 INJECTION, SOLUTION INFILTRATION; PERINEURAL at 14:01

## 2022-12-07 NOTE — LETTER
December 7, 2022      Shabnam Yu  [address] [facility] [address]        To Whom It May Concern:    Shabnam Yu  was seen on 12/7/22.  Please excuse her from work until 12/16/22 due to injury.        Sincerely,        Albert Yeo, MD

## 2022-12-07 NOTE — TELEPHONE ENCOUNTER
Returned call to patient after voice message left this am.  Patient is very concerned about her left knee, the re-accumulation of fluid, pain, and difficulties working.  Patient is being seen by sports medicine this afternoon.  Strongly encouraged patient to discuss her concerns with sports medicine, to have them assess if she can fully bear weight, crutches needed, and any accommodations at work.  Patient is scheduled for MRI tomorrow.  Patient did start acetaminophen as Dr. Keith ordered, patient has not yet picked up the celebrex. Strongly encouraged patient to follow Dr. Keith's recommendations.    All questions answered for now.    WILLIAM BenavidezN, RN  RN Care Coordinator Rheumatology

## 2022-12-07 NOTE — TELEPHONE ENCOUNTER
[facility] Call Center    Phone Message    May a detailed message be left on voicemail: yes     Reason for Call: Other: Pt had fluid removed on 11/28 from her knee and it is swollen again and she can barely walk. No avail appts until 12/13 she did not scheduled and was hoping she can get something done until then please call her for options     Action Taken: Other: ortho [place]    Travel Screening: Not Applicable

## 2022-12-07 NOTE — PATIENT INSTRUCTIONS
1. Effusion, left knee    2. Acute pain of left knee      -Patient is following up for recurrence of left knee swelling due to unknown etiology  -Patient had her left knee aspirated and cortisone injection performed 1 week ago and large effusion returned 1 week later  -Patient was seen by her rheumatologist for her juvenile rheumatoid arthritis and states that a large recurrence of an effusion after a cortisone injection is uncharacteristic of rheumatologic disorders and so ordered an MRI for further evaluation  -Patient tolerated repeat aspiration today without complications.  Fluid was sent off for analysis to evaluate for possible intra-articular infection.  Patient inspection of the fluid appears typical for inflammatory fluid  -Patient was prescribed Celebrex and Tylenol  -Patient may purchase an over-the-counter compression sleeve to provide some support and hopefully keep her swelling down  -Patient will call my office next week after her MRI is complete to discuss results and next of treatment options  -Call direct clinic number [[phone]] at any time with questions or concerns.    Albert Yeo MD [facility]  [facility] [place]

## 2022-12-07 NOTE — PROGRESS NOTES
ASSESSMENT & PLAN  Patient Instructions     1. Effusion, left knee    2. Acute pain of left knee      -Patient is following up for recurrence of left knee swelling due to unknown etiology  -Patient had her left knee aspirated and cortisone injection performed 1 week ago and large effusion returned 1 week later  -Patient was seen by her rheumatologist for her juvenile rheumatoid arthritis and states that a large recurrence of an effusion after a cortisone injection is uncharacteristic of rheumatologic disorders and so ordered an MRI for further evaluation  -Patient tolerated repeat aspiration today without complications.  Fluid was sent off for analysis to evaluate for possible intra-articular infection.  Patient inspection of the fluid appears typical for inflammatory fluid  -Patient was prescribed Celebrex and Tylenol  -Patient may purchase an over-the-counter compression sleeve to provide some support and hopefully keep her swelling down  -Patient will call my office next week after her MRI is complete to discuss results and next of treatment options  -Call direct clinic number [[phone]] at any time with questions or concerns.    Albert Yeo MD [facility] Orthopedics and Sports Medicine  [facility]          -----    SUBJECTIVE:  Shabnam Yu is a 22 year old female who is seen in follow-up for left knee pain and swelling.They were last seen 11/28/22    Since their last visit reports worsening pain. They indicate that their current pain level is 4/10 at rest/ 8/10 while weight bearing. They have tried corticosteroid injection with aspiration (most recent date: 11/28/2022). She states her knee pain she thinks worsened since having corticosteriod injection, swelling was improved for about ~1 week but then swelling returned. Humira, started Celebrex and Tylenol 1000mg per day yesterday, rest, ice, compression sleeve, ibuprofen       The patient is seen with their mother.    Patient's  past medical, surgical, social, and family histories were reviewed today and no changes are noted.    REVIEW OF SYSTEMS:  Constitutional: NEGATIVE for fever, chills, change in weight  Skin: NEGATIVE for worrisome rashes, moles or lesions  GI/: NEGATIVE for bowel or bladder changes  Neuro: NEGATIVE for weakness, dizziness or paresthesias    OBJECTIVE:  /74   Wt 62.6 kg (138 lb)   BMI 24.45 kg/m     General: healthy, alert and in no distress  HEENT: no scleral icterus or conjunctival erythema  Skin: no suspicious lesions or rash. No jaundice.  CV: regular rhythm by palpation, no pedal edema  Resp: normal respiratory effort without conversational dyspnea   Psych: normal mood and affect  Gait: normal steady gait with appropriate coordination and balance  Neuro: normal light touch sensory exam of the extremities.    MSK:  LEFT KNEE  Inspection:    normal alignment  Palpation:    Tender about the lateral joint line and medial joint line. Remainder of bony and ligamentous landmarks are nontender.    Large effusion is present    Patellofemoral crepitus is Absent  Range of Motion:     00 extension to 1200 flexion  Strength:    Quadriceps grossly intact    Extensor mechanism intact  Special Tests:    Positive: none    Negative: MCL/valgus stress (0 & 30 deg), LCL/varus stress (0 & 30 deg), Lachman's, anterior drawer, posterior drawer, Lola's    Independent visualization of the below image:  Large Joint Injection/Arthocentesis: L knee joint    Date/Time: 12/7/2022 2:01 PM  Performed by: Yeo, Albert, MD  Authorized by: Yeo, Albert, MD     Indications:  Pain and osteoarthritis  Needle Size:  22 G  Guidance: ultrasound    Approach:  Lateral  Location:  Knee      Medications:  5 mL lidocaine 1 %  Aspirate amount (mL):  87  Aspirate:  Serous and yellow  Outcome:  Tolerated well, no immediate complications  Procedure discussed: discussed risks, benefits, and alternatives    Consent Given by:  Patient  Prep: patient was  prepped and draped in usual sterile fashion          Patient's conditions were thoroughly discussed during today's visit with total time spent face-to-face with the patient and documentation being 22 minutes.    Albert Yeo MD, [facility]

## 2022-12-07 NOTE — LETTER
12/7/2022         RE: Shabnam Yu  [address] [facility] [address]        Dear Colleague,    Thank you for referring your patient, Shabnam Yu, to the [facility] [place]. Please see a copy of my visit note below.    ASSESSMENT & PLAN  Patient Instructions     1. Effusion, left knee    2. Acute pain of left knee      -Patient is following up for recurrence of left knee swelling due to unknown etiology  -Patient had her left knee aspirated and cortisone injection performed 1 week ago and large effusion returned 1 week later  -Patient was seen by her rheumatologist for her juvenile rheumatoid arthritis and states that a large recurrence of an effusion after a cortisone injection is uncharacteristic of rheumatologic disorders and so ordered an MRI for further evaluation  -Patient tolerated repeat aspiration today without complications.  Fluid was sent off for analysis to evaluate for possible intra-articular infection.  Patient inspection of the fluid appears typical for inflammatory fluid  -Patient was prescribed Celebrex and Tylenol  -Patient may purchase an over-the-counter compression sleeve to provide some support and hopefully keep her swelling down  -Patient will call my office next week after her MRI is complete to discuss results and next of treatment options  -Call direct clinic number [[phone]] at any time with questions or concerns.    Albert Yeo MD [facility] Medicine  [facility]          -----    SUBJECTIVE:  Shabnam Yu is a 22 year old female who is seen in follow-up for left knee pain and swelling.They were last seen 11/28/22    Since their last visit reports worsening pain. They indicate that their current pain level is 4/10 at rest/ 8/10 while weight bearing. They have tried corticosteroid injection with aspiration (most recent date: 11/28/2022). She states her knee pain she  thinks worsened since having corticosteriod injection, swelling was improved for about ~1 week but then swelling returned. Humira, started Celebrex and Tylenol 1000mg per day yesterday, rest, ice, compression sleeve, ibuprofen       The patient is seen with their mother.    Patient's past medical, surgical, social, and family histories were reviewed today and no changes are noted.    REVIEW OF SYSTEMS:  Constitutional: NEGATIVE for fever, chills, change in weight  Skin: NEGATIVE for worrisome rashes, moles or lesions  GI/: NEGATIVE for bowel or bladder changes  Neuro: NEGATIVE for weakness, dizziness or paresthesias    OBJECTIVE:  /74   Wt 62.6 kg (138 lb)   BMI 24.45 kg/m     General: healthy, alert and in no distress  HEENT: no scleral icterus or conjunctival erythema  Skin: no suspicious lesions or rash. No jaundice.  CV: regular rhythm by palpation, no pedal edema  Resp: normal respiratory effort without conversational dyspnea   Psych: normal mood and affect  Gait: normal steady gait with appropriate coordination and balance  Neuro: normal light touch sensory exam of the extremities.    MSK:  LEFT KNEE  Inspection:    normal alignment  Palpation:    Tender about the lateral joint line and medial joint line. Remainder of bony and ligamentous landmarks are nontender.    Large effusion is present    Patellofemoral crepitus is Absent  Range of Motion:     00 extension to 1200 flexion  Strength:    Quadriceps grossly intact    Extensor mechanism intact  Special Tests:    Positive: none    Negative: MCL/valgus stress (0 & 30 deg), LCL/varus stress (0 & 30 deg), Lachman's, anterior drawer, posterior drawer, Lola's    Independent visualization of the below image:  Large Joint Injection/Arthocentesis    Date/Time: 12/7/2022 2:01 PM  Performed by: Yeo, Albert, MD  Authorized by: Yeo, Albert, MD     Indications:  Pain and osteoarthritis  Needle Size:  22 G  Guidance: ultrasound    Approach:   Lateral  Location:  Knee      Medications:  5 mL lidocaine 1 %  Aspirate amount (mL):  87  Aspirate:  Serous and yellow  Outcome:  Tolerated well, no immediate complications  Procedure discussed: discussed risks, benefits, and alternatives    Consent Given by:  Patient  Prep: patient was prepped and draped in usual sterile fashion          Patient's conditions were thoroughly discussed during today's visit with total time spent face-to-face with the patient and documentation being 22 minutes.    Albert Yeo MD, [facility]            Again, thank you for allowing me to participate in the care of your patient.        Sincerely,        Albert Yeo, MD

## 2022-12-07 NOTE — TELEPHONE ENCOUNTER
Phone call to patient     She states her knee swelling is worse even this morning, cannot walk, had to call into work today  She is flying occupational therapy of [employer] tomorrow for a grad school interview and is concerned about swelling    discussed with Dr.Yeo, double booked patient to come in this afternoon for left knee aspiration at 1:40pm    Patient verbalized understanding, appreciative    Paige Mcpherson, ATC

## 2022-12-08 ENCOUNTER — ANCILLARY PROCEDURE (OUTPATIENT)
Dept: MRI IMAGING | Facility: CLINIC | Age: 22
End: 2022-12-08
Attending: INTERNAL MEDICINE
Payer: COMMERCIAL

## 2022-12-08 DIAGNOSIS — M25.462 EFFUSION, LEFT KNEE: ICD-10-CM

## 2022-12-08 PROCEDURE — 255N000002 HC RX 255 OP 636: Performed by: INTERNAL MEDICINE

## 2022-12-08 PROCEDURE — A9585 GADOBUTROL INJECTION: HCPCS | Performed by: INTERNAL MEDICINE

## 2022-12-08 PROCEDURE — 73723 MRI JOINT LWR EXTR W/O&W/DYE: CPT | Mod: LT

## 2022-12-08 RX ORDER — GADOBUTROL 604.72 MG/ML
7 INJECTION INTRAVENOUS ONCE
Status: COMPLETED | OUTPATIENT
Start: 2022-12-08 | End: 2022-12-08

## 2022-12-08 RX ADMIN — GADOBUTROL 7 ML: 604.72 INJECTION INTRAVENOUS at 08:08

## 2022-12-12 ENCOUNTER — MYC MEDICAL ADVICE (OUTPATIENT)
Dept: ORTHOPEDICS | Facility: CLINIC | Age: 22
End: 2022-12-12

## 2022-12-12 NOTE — TELEPHONE ENCOUNTER
Duplicate encounter, closing, see other MyChart encounter dated 12/12/22    Paige Mcpherson, ATC

## 2022-12-13 ENCOUNTER — TELEPHONE (OUTPATIENT)
Dept: RHEUMATOLOGY | Facility: CLINIC | Age: 22
End: 2022-12-13

## 2022-12-13 LAB
BACTERIA SNV CULT: NO GROWTH
GRAM STAIN RESULT: NORMAL
GRAM STAIN RESULT: NORMAL

## 2022-12-13 NOTE — TELEPHONE ENCOUNTER
----- Message from Quang Keith MD sent at 12/12/2022  4:38 PM CST -----  Regarding: FW: MRI today  I request video visit at the opening on my schedule for 12-15 (?1000) to discuss MRI results.  ----- Message -----  From: Celi De La Rosa RN  Sent: 12/8/2022   3:18 PM CST  To: Selina Russell RN, Quang Keith MD  Subject: MRI today                                        Hi Dr. Keith-  You had a video visit with Shabnam earlier this week.    She went back to Sport Medicine yesterday and had her knee tapped again- no infection noted in fluid.    She had her MRI today-I am sure she will be reaching out to discuss if you have any further recommendations.    Thanks-  Celi De La Rosa, WILLIAMN, RN  RN Care Coordinator Rheumatology

## 2022-12-15 ENCOUNTER — MYC MEDICAL ADVICE (OUTPATIENT)
Dept: RHEUMATOLOGY | Facility: CLINIC | Age: 22
End: 2022-12-15

## 2022-12-15 ENCOUNTER — TELEPHONE (OUTPATIENT)
Dept: RHEUMATOLOGY | Facility: CLINIC | Age: 22
End: 2022-12-15

## 2022-12-15 ENCOUNTER — VIRTUAL VISIT (OUTPATIENT)
Dept: RHEUMATOLOGY | Facility: CLINIC | Age: 22
End: 2022-12-15
Payer: COMMERCIAL

## 2022-12-15 DIAGNOSIS — M08.90 CHRONIC ARTHRITIS OF JUVENILE ONSET (H): Primary | ICD-10-CM

## 2022-12-15 PROCEDURE — 99214 OFFICE O/P EST MOD 30 MIN: CPT | Mod: GT | Performed by: INTERNAL MEDICINE

## 2022-12-15 ASSESSMENT — PAIN SCALES - GENERAL: PAINLEVEL: MILD PAIN (3)

## 2022-12-15 NOTE — TELEPHONE ENCOUNTER
Letter sent to [facility] per the patient's request.  Copy also placed in the mail.     Selina Russell RN

## 2022-12-15 NOTE — PATIENT INSTRUCTIONS
"Dx:  Juvenile inflammatory arthritis: Left knee swelling/pain has continued. MRI shows inflammatory changes: \"synovitis\".    Plan:  1.  Stop humira  2. Start orencia 125 mg once weekly no less than 10 days after last humira dose.  3.  Celebrex 100 mg 1 tab up to twice daily as needed for knee stiffness and swelling; use acetaminophen 1000 mg 3 times a day around-the-clock.  4.  Check kidney function and blood counts in 4-2022  5. Progress report to rheumatology in 6 weeks.    "

## 2022-12-15 NOTE — TELEPHONE ENCOUNTER
PA Initiation    Medication: Orencia PA Pending  Insurance Company: [employer] ([facility]) - Phone [phone] Fax [phone]  Pharmacy Filling the Rx:    Filling Pharmacy Phone:    Filling Pharmacy Fax:    Start Date: 12/15/2022    Key: [id number]

## 2022-12-15 NOTE — NURSING NOTE
Chief Complaint   Patient presents with     RECHECK     Effusion, left knee +1 more       There were no vitals filed for this visit.    There is no height or weight on file to calculate BMI.    Jammie Peres, [facility]

## 2022-12-15 NOTE — PROGRESS NOTES
"Shabnam is a 22 year old who is being evaluated via a billable video visit.      How would you like to obtain your AVS? MyChart  If the video visit is dropped, the invitation should be resent by: Text to cell phone: [phone]  Will anyone else be joining your video visit? No        Video-Visit Details    Video Start Time: 10:12 AM    Type of service:  Video Visit    Video End Time:10:38 AM    Originating Location (pt. Location): Home    Distant Location (provider location):  On-site    Platform used for Video Visit: [facility]      Rheumatology Clinic-video followup     Shabnam Yu MRN# [id number]   YOB: 2000 Age: 22 year old     Date of Visit: 12/15/2022  Primary care provider: Bebe Bartlett          Assessment and Plan:     # Recurrent oligoarticular inflammatory arthritis:   L knee pain, swelling and stiffness persists despite aspiration injection 12-7-22.    Data: Synovial fluid analysis from November 28 2022 left knee aspiration shows a white count of approximately 5000, on the borderline of cell counts that would be associated with \"inflammatory\" and noninflammatory causes.    Imaging: MRI of the left knee on December 8, 2022 showed large effusion with extensive synovial enhancement and synovitis.  No internal derangements or stress fractures noted.    We discussed my impression that MRI of L knee shows findings most consistent with active inflammatory arthritis typified by fluid accumulation and synovial thickening with likely leukocyte infiltration.  No evidence of structural abnormalities, ligamentous or meniscal injury, or fracture.     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  Change in immunomodulatory therapy is warranted.  We discussed multiple potential therapeutic options, including Actemra, Luis Eduardo inhibitors, and abatacept.  I recommend abatacept, given its excellent safety and " tolerability profile, and demonstrated utility in juvenile inflammatory arthritis.  I expect benefit within 2 to 4 weeks of starting abatacept, with maximum benefit at 6 to 8 weeks.    We discussed the following plan:    1.  Stop humira  2. Start orencia 125 mg once weekly no less than 10 days after last humira dose.  3.  Celebrex 100 mg 1 tab up to twice daily as needed for knee stiffness and swelling; use acetaminophen 1000 mg 3 times a day around-the-clock.  4.  Check kidney function and blood counts in 4-2022  5. Progress report to rheumatology in 6 weeks.    RTC 6 mos    Quang Keith MD  Staff Rheumatologist, [facility]       On the day of the encounter, a total of 30 minutes was spent in chart review, and in counseling and coordination of care, regarding the patient's complex medical problem of increasing left knee symptoms in a setting of juvenile onset inflammatory oligoarthritis.  I answered all questions to the best my ability.         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 She was last seen 12-6-22.  Humira was continued, but knee MRI was ordered to evaluate persistent left knee effusion and swelling/warmth.    Interval history December 15, 2022  Video encounter occurred in the presence of patient's mother.  The left knee is still swollen; she had it drained again on December 7 due to pain and swelling. The knee remains painful with walking; she has some relief with celebrex once or twice daily, also with joint brace.    Interval history December 6, 2022    On 10-28 2022, she did an intense workout with cardio. She noted pain after the workout in many areas. Throughout the night, she had increasing pain in her left knee. She then had increased  "swelling in the knee over ensuing weeks. It prevented her from normal walking; she could not work out at the gym.    Patient was seen in sports medicine in November 28, 2022.  Impression was of chronic juvenile onset arthritis with recurrent left knee effusion.  History of prior intra-articular injections in the left knee was reviewed with the conclusion that the most recent injection has been performed over 2 years prior to the appointment.  Recommendation was to make a trial of physical therapy and home exercise program.  Aspiration and injection of the left knee was performed.    She had injection a week ago. Pain and swelling have since then improved, but when walking around, she has had some L knee pain.   However yesterday, she woke up with \"inflammation\" in the knee with stiffness,  Decreased walking ability. No redness, + warmth,  +modest tenderness around the knee.    No other joint symptoms.  No fevers  She noted a rash, starting in the Fall, none for a few weeks, red splotches on face and neck; saw Derm; she was told \"dermatitis\" allergic reaction possibly to skn care product.  +dry eyes for 6 months; difficulty to wear contacts, she will switch to shorter duration contacts. She stopped taking sertraline; she lost weight, and drinks water/coffee.    She has been using humira 40 mg every other week and describes strict adherence with med since early Fall.    Interval history May 27, 2022    She developed fever, fatigue, weakness, and ST, sinus congestion last week. She was tested for viral pathogens, all negative. She used fluids, rest, and acetaminophen; fever has broken, but ST persists.    Before the recent illness, she had been exercising regularly with gym workouts or 3-4 mile walks. Minimal joint pain. She reports noJIA flareups in months, and she feels much more confident about exercise without inducing flare.     She notes occasional knee discomfort for a few days before scheduled humira " injections. She has been regular with using humira. Still has red/puffy at site lasting 1-2 days, occasionally bruising.    She will start new job at [employer]    Interval history 10-:    Other than recent cold, health is good. Her knees have not flared recently. She is using humira regularly. She does note warm/redness at site after injection, lasting less than 24 hours.   Occasionally she notes slight extra stiffness or discomfort in the lead up to scheduled humira dosing. No early morning stiffness. She does not use NSAIDs for joint pain.   Time constraints have limited aerobic exercise, but walking extenstively does not exacerbate knee or joint pain.    she is active socially and athletically.  She is carrying a 15 credit load in college.    Background hx 8-, transition to adult care:  At the age of 4 patient developed problems with swelling in her knees and hands. Her parents noted alteration in her knee anatomy during soccer practice. She ultimately was diagnosed as having juvenile rheumatoid arthritis with many involved joints (fingers, toes, and knees) and saw Dr. MORALES Silva, a pediatric rheumatologist at [facility]. She was treated with methotrexate and at the age of 11 went into remission. At age 12, she had stopped medication, but her left knee remained swollen. She had an injection in the left knee.    Since then, she reports several instances of swelling and pain in her knee. Those were short lived. In late 2019 she began having recurrent difficulties with pain and swelling in the left knee.  She saw Dr. Mckenzie in rheumatology at [facility].  Flare of inflammatory arthritis in the left knee was diagnosed, and the knee was aspirated and injected.  She saw Dr. Mckenzie in follow-up virtually on Patito 3, 2020.  At that time excellent and durable response to   left knee injections was reported, but the left knee had again become swollen, stiff, and painful.  At that  "point, the plan was to use scheduled naproxen and to start sulfasalazine.  In July, she followed up with Dr. Mckenzie, and reported poor tolerance of naproxen and intermittent continued left knee swelling and pain.  Dr. Mckenzie recommended advancing the dose of sulfasalazine.    On July 11, the L knee again swelling, and was aspirated and injected again. All the swelling has since disappeared. She does not have pain on a daily basis. She has been hiking and canoeing without difficulty. Somestimes, the left knee feels \"off\" compared to the R, but no swelling/loss of ROM. No redness/warmth.    She has had no problems with pain or swelling in joints elsewhere recently with the exception of pain in the right TMJ region.    In the recent past she has been using 600 mg twice daily of ibuprofen. She developed stomach upset and chest pain. She stopped it and that has improved. Sulfasalazine has also been difficult to take because of heartburn, and she has taken it only occasionally at reduced doses since visiting with Dr. Mckenzie.    She has been under increasing stress at school. She has had no history of psoriasis or photosensitivity. There is no history of iritis or uveitis, dry eyes or dry mouth, she has had no problems with pleurisy or asthma. There is no history of stomach ulcers or inflammatory bowel disease. She has had no problems with kidney or liver disease, diabetes or thyroid disease, cytopenias or neurologic disease. There is no history of hypertension.            Review of Systems:       Constitutional: none  Skin: neg  Eyes: no eye pain or vision change  Ears/Nose/Throat: negative  Respiratory: she has SOB, associated with anxiety  Cardiovascular: negative  Gastrointestinal: + GERD symptoms, stable  Genitourinary: negative  Musculoskeletal: negative  Neurologic: negative  Psychiatric: negative  Hematologic/Lymphatic/Immunologic: neg  Endocrine: negative          Past Medical History:     Past Medical " History:   Diagnosis Date     Anxiety      Depressive disorder      Juvenile arthritis (H) 01/2004     Past Surgical History:   Procedure Laterality Date     CARDIAC SURGERY N/A 01/2004     # congenital heart disease: septal defect which was treated with a coiling procedure at age 4.  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  Activity: She walks regularly         Family History:     Family History   Problem Relation Age of Onset     Cancer Maternal Grandmother      Cancer Paternal Grandmother      Cancer Paternal Grandfather      Family History: Negative with respect to inflammatory disease.       Allergies:   No Known Allergies         Medications:     Current Outpatient Medications   Medication Sig Dispense Refill     adalimumab (HUMIRA *CF* PEN) 40 MG/0.4ML pen kit INJECT THE CONTENTS OF 1 AUTOINJECTOR PEN UNDER THE SKIN EVERY OTHER WEEK 2 each 6     celecoxib (CELEBREX) 100 MG capsule Take 1 tab once or twice daily for knee pain and swelling. 60 capsule 1     diphenhydrAMINE-zinc acetate (BENADRYL) 2-0.1 % external cream Apply topically 3 times daily as needed for itching (Patient not taking: Reported on 12/6/2022) 28 g 1     norgestimate-ethinyl estradiol (ORTHO-CYCLEN) 0.25-35 MG-MCG tablet Take 1 tablet by mouth       sertraline (ZOLOFT) 50 MG tablet Take 50 mg by mouth daily              Physical Exam:   There were no vitals taken for this visit.  Wt Readings from Last 4 Encounters:   12/07/22 62.6 kg (138 lb)   11/28/22 62.6 kg (138 lb)   05/27/22 64.5 kg (142 lb 3.2 oz)   04/27/21 65.2 kg (143 lb 12.8 oz)       No exam video  visit         Data:     RHEUM RESULTS Latest Ref Rng & Units 11/13/2008 2/5/2009 5/16/2009   ALBUMIN 3.4 - 5.0 g/dL 4.5 - 4.9   ALT 0 - 50 U/L 20 8 11   AST 0 - 45 U/L 39 - -   CREATININE 0.52 - 1.04 mg/dL 0.51 0.49 0.54   CRP <5.00 mg/L - - -   GFR ESTIMATE, IF BLACK >60 mL/min/[1.73:m2] GFR not calculated, patient <16 years old. GFR not calculated, patient <16 years old. GFR not calculated, patient <16 years old.   GFR ESTIMATE >60 mL/min/1.73m2 GFR not calculated, patient <16 years old. GFR not calculated, patient <16 years old. GFR not calculated, patient <16 years old.   HEMATOCRIT 35.0 - 47.0 % 37.9 39.7 37.2   HEMOGLOBIN 11.7 - 15.7 g/dL 13.5 13.9 13.3   HEPBANG NR:Nonreactive - - -   HCVAB NR:Nonreactive - - -   WBC 4.0 - 11.0 10e3/uL 4.4(L) 4.2(L) 4.4(L)   RBC 3.80 - 5.20 10e6/uL 4.54 4.54 4.32   RDW 10.0 - 15.0 % 13.2 12.9 12.3   MCHC 31.5 - 36.5 g/dL 35.6 35.0 35.8   MCV 78 - 100 fL 84 87 86   PLATELET COUNT 150 - 450 10e3/uL 245 258 227   ESR 0 - 15 mm/h 20(H) - -   QUANTIFERON-TB GOLD PLUS RESULT Negative - - -

## 2022-12-15 NOTE — LETTER
[facility] [place]  [address] [address]  [phone]          December 15, 2022    RE:  Shabnam Yu                                                                                                                                                       [address] [facility] [address]      Whom it may concern:     Shabnam Yu s under my care at the [facility] rheumatology clinic.  She carries a diagnosis of inflammatory arthritis.  She is compliant with treatment and diagnostic recommendations, but she has experienced a flare in her condition recently that makes it difficult for her to sustain weightbearing for prolonged periods.  I expect that difficulty with walking and weightbearing may continue through February 2023.  I request accommodation be made such that she can spend between 5 and 10 minutes/h during work shifts sitting with her leg elevated in order to reduce discomfort associated with the medical condition.  I expect that long term, with treatment, restrictions on activity at work will not be necessary. She can return to work 12/19/2022.     Please do not hesitate to contact me if I can answer further questions.         Sincerely,       Quang Keith MD   Staff Rheumatologist, [facility]

## 2022-12-16 ENCOUNTER — TELEPHONE (OUTPATIENT)
Dept: RHEUMATOLOGY | Facility: CLINIC | Age: 22
End: 2022-12-16

## 2022-12-16 ENCOUNTER — MYC MEDICAL ADVICE (OUTPATIENT)
Dept: RHEUMATOLOGY | Facility: CLINIC | Age: 22
End: 2022-12-16

## 2022-12-16 NOTE — TELEPHONE ENCOUNTER
Talked with pt. she said she was busy and will call back at [phone] to schedule a 6m f/u with Dr. Keith

## 2022-12-16 NOTE — TELEPHONE ENCOUNTER
Pt is calling back for she had contact the billing office number given below and was told they do not handle PA's for the MRI and that is usually something the provider's office handles.    Please contact the Pt back to let her know what the next steps are?

## 2022-12-20 NOTE — TELEPHONE ENCOUNTER
Patient called after update in [employer] message.  She states she will call the billing office to initiate the retro prior authorization.      Selina Russell RN

## 2022-12-21 NOTE — TELEPHONE ENCOUNTER
Prior Authorization Approval    Authorization Effective Date: 12/15/2022  Authorization Expiration Date: 12/15/2023  Medication: Orencia PA Approved  Approved Dose/Quantity: weekly  Reference #: Key: [id number]   Insurance Company: [employer] ([facility]) - Phone [phone] Fax [phone]  Expected CoPay:       CoPay Card Available:      Foundation Assistance Needed:    Which Pharmacy is filling the prescription (Not needed for infusion/clinic administered): [facility] - [place], [place] - [address]  Pharmacy Notified: Yes  Patient Notified: Yes

## 2023-01-13 ENCOUNTER — TELEPHONE (OUTPATIENT)
Dept: RHEUMATOLOGY | Facility: CLINIC | Age: 23
End: 2023-01-13

## 2023-01-13 NOTE — TELEPHONE ENCOUNTER
Patient called this RN directly.  Patient has taken 3 doses of her abatacept (Orencia) .  She takes her abatacept (Orencia) on Thursday evenings.  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  Yesterday patient became light headed at work, starting seeing spots, felt like she would pass out.    Patient did take her 3rd abatacept (Orencia) last evening.  Today patient felt better, however stayed home from work because she has had intermittent dizziness, no vision changes.  Patient did go to  today, lab work, EKG done, negative.  Patient did call specialty pharmacy to discuss if her dizziness could be related to the abatacept (Orencia) .  Encourage patient to hydrate well, eat breakfast before work, and monitor her symptoms.  Patient asked this RN to let Dr Keith know.  This RN also discussed patient with pharmacist Ana who felt the dizziness was unlikely to be due to the abatacept (Orencia)  Since the symptoms just presented yesterday and she took her Orenica in the evening after the symptoms.      Will route update to Dr. Keith.    Celi De La Rosa, BSN, RN  RN Care Coordinator Rheumatology

## 2023-01-16 NOTE — TELEPHONE ENCOUNTER
I agree that the symptoms do not suggest adverse effect of orencia. I recommend continuing orencia, and monitoring symptoms.

## 2023-01-27 ENCOUNTER — TELEPHONE (OUTPATIENT)
Dept: RHEUMATOLOGY | Facility: CLINIC | Age: 23
End: 2023-01-27

## 2023-01-27 NOTE — TELEPHONE ENCOUNTER
Patient has a new insurance ([employer]) starting 2/1/2021.     Please start a new PA for the [facility] by calling [phone] or by web site [email or web address]     Patient's [employer] member number is [id number].

## 2023-02-04 NOTE — TELEPHONE ENCOUNTER
[facility] Prior Authorization Team   Phone: [phone]  Fax: [phone]    PA Initiation    Medication: Orencia  Insurance Company: [employer] [facility] - Phone [phone] Fax [phone]  Pharmacy Filling the Rx:    Filling Pharmacy Phone:    Filling Pharmacy Fax:    Start Date: 2/3/2023

## 2023-02-06 DIAGNOSIS — M08.90 CHRONIC ARTHRITIS OF JUVENILE ONSET (H): ICD-10-CM

## 2023-02-06 NOTE — TELEPHONE ENCOUNTER
Prior Authorization Approval    Authorization Effective Date:    Authorization Expiration Date: 2/4/2024  Medication: Orencia  Approved Dose/Quantity: weekly  Reference #: [id number]   Insurance Company: [employer] - Phone [phone] Fax [phone]  Expected CoPay:       CoPay Card Available: Yes    Foundation Assistance Needed:    Which Pharmacy is filling the prescription (Not needed for infusion/clinic administered): [facility] - [place], [place] - [address]  Pharmacy Notified: Yes  Patient Notified: Yes

## 2023-02-23 NOTE — PATIENT INSTRUCTIONS
NO WORK FOR 24 HOURS DUE TO CONTAGIOUS INFECTION        Bacterial Conjunctivitis    You have an infection in the membranes covering the white part of the eye. This part of the eye is called the conjunctiva. The infection is called conjunctivitis. The most common symptoms of conjunctivitis include a thick, pus-like discharge from the eye, swollen eyelids, redness, eyelids sticking together upon awakening, and a gritty or scratchy feeling in the eye. Your infection was caused by bacteria. It may be treated with medicine. With treatment, the infection takes about 7 to 10 days to resolve.   Home care    Use prescribed antibiotic eye drops or ointment as directed to treat the infection.    Apply a warm compress (towel soaked in warm water) to the affected eye 3 to 4 times a day. Do this just before applying medicine to the eye.    Use a warm, wet cloth to wipe away crusting of the eyelids in the morning. This is caused by mucus drainage during the night. You may also use saline irrigating solution or artificial tears to rinse away mucus in the eye. Do not put a patch over the eye.    Wash your hands before and after touching the infected eye. This is to prevent spreading the infection to the other eye, and to other people. Don't share your towels or washcloths with others.    You may use acetaminophen or ibuprofen to control pain, unless another medicine was prescribed. Talk with your healthcare provider before using these medicines if you have chronic liver or kidney disease. Also talk with your provider if you have ever had a stomach ulcer or digestive bleeding.    Don't wear contact lenses until your eyes have healed and all symptoms are gone.    Follow-up care  Follow up with your healthcare provider, or as advised.  When to seek medical advice  Call your healthcare provider right away if any of these occur:    Worsening vision    Increasing pain in the eye    Increasing swelling or redness of the  eyelid    Redness spreading around the eye  Stitcher last reviewed this educational content on 4/1/2020 2000-2021 [employer]. All rights reserved. This information is not intended as a substitute for professional medical care. Always follow your healthcare professional's instructions.         Tranexamic Acid Pregnancy And Lactation Text: It is unknown if this medication is safe during pregnancy or breast feeding.

## 2023-04-02 ENCOUNTER — HEALTH MAINTENANCE LETTER (OUTPATIENT)
Age: 23
End: 2023-04-02

## 2023-06-23 DIAGNOSIS — M08.90 CHRONIC ARTHRITIS OF JUVENILE ONSET (H): ICD-10-CM

## 2023-06-26 NOTE — TELEPHONE ENCOUNTER
Last Written Prescription Date:  2/6/23  Last Fill Quantity: 4ml ,  # refills: 4  Last office visit: Visit date not found ; last virtual visit: 12/15/2022 with prescribing provider:  7/14/23  Future Office Visit:   Next 5 appointments (look out 90 days)    Jul 14, 2023 10:30 AM  (Arrive by 10:15 AM)  Return Visit with Quang Keith MD  [facility] [place] ([facility] ) [address] [place] [id number] [phone]         Requested Prescriptions   Pending Prescriptions Disp Refills     Abatacept (ORENCIA) 125 MG/ML SOAJ auto-injector 4 mL 4     Sig: Inject 1 mL (125 mg) Subcutaneous every 7 days       There is no refill protocol information for this order        Selina Russell RN

## 2023-07-13 ENCOUNTER — MYC MEDICAL ADVICE (OUTPATIENT)
Dept: RHEUMATOLOGY | Facility: CLINIC | Age: 23
End: 2023-07-13
Payer: COMMERCIAL
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
## 2023-08-23 NOTE — PROGRESS NOTES
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  Physical exam shows no inflammatory joint changes.    Data: Synovial fluid analysis from November 28 2022 left knee aspiration showed 5000 white cells.    Imaging: MRI of the left knee on December 8, 2022 showed large effusion with extensive synovial enhancement and synovitis.  No internal derangements or stress fractures noted.    Discussion: Oligoarthritis affecting primarily left knee, due to juvenile idiopathic arthritis persistent into adulthood, remains greatly improved/in remission since mid winter 2022- 23.  Improvement has correlated tightly with discontinuation of adalimumab, and with initiation of abatacept.  I strongly recommend continued use of abatacept for symptom relief, and for protection against long-term joint damage, given the drug's excellent safety and tolerability profile, and demonstrated utility in juvenile inflammatory arthritis.  I discussed the possibility of intravenous infusion as an alternative, should abatacept subcutaneously not prove financially feasible going forward.  Additional alternatives include anti-IL-6 (Actemra), and  Luis Eduardo kinase inhibitors, but these options are much less desirable because they have not been tried before and efficacy would not be guaranteed.    Plan:  1.  Blood work for creatinine, basic metabolic panel, CBC, and CRP.  2.  Continue Orencia 125 mg subcu once weekly.  If subcutaneous Orencia is financially not feasible, but infusion therapy is better from a financial clinic for review, I would expect identical benefit from monthly infusion of Orencia.  3.   Discuss patient assistance program by consulting with medication therapy monitoring service  4.  Discuss chronic sinus congestion, potentially related to allergy, with primary care.    RTC 4 or 7 mos    Quang Keith MD  Staff Rheumatologist, [facility] Placed This Encounter   Procedures    CRP inflammation    Basic metabolic panel    CBC with platelets    Med Therapy Management Referral     On the day of the encounter, a total of 25 minutes was spent in chart review, and in counseling and coordination of care, regarding the patient's complex medical problem of left knee synovitis on the background of juvenile onset inflammatory oligoarthritis.          Active Problem List:     Patient Active Problem List    Diagnosis Date Noted    AYDEN (juvenile idiopathic arthritis) (H) 07/14/2014     Priority: Medium    Myopia 07/14/2014     Priority: Medium            History of Present Illness:   Shabnam Yu follows up for juvenile rheumatoid arthritis. She was last seen 12-15-22.  Oligoarticular inflammatory arthritis was active, and recommendation was to discontinue Humira.  Recommendation was to make a trial of Orencia and celecoxib.    Interval history August 24, 2023    Today, she reports that knees have been doing Ok. No major flares since winter 2022-23. No visible knee swelling. Minimal disruptino of ADLs. On the day of/before orencia she feels achiness in the knee. 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    She feels that orencia has been instrumental in sustained improvement she has noted in her knees.   She has noted difficulty with obtaining orencia, moreso than with humira. Patient assistance program will give less assistance than she had previously.    Patient has required no corticosteroids, either injected or oral, and she has stopped using celecoxib entirely.    Patient visited with ophthalmology on August 23, 2023.  Records are  "not available for review today, she was told that there was \"no evidence\" of uveitis or retinal disease.    Patient reports sinus \"pressure\", worse upon awakening each day, and associated with clear drainage, for several months.  She had an episode of sinus infection that was treated with antibiotics.  Symptoms associated with that event have improved.    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  She now resides near [place] and has primary care through physicians assistant there.      Interval history December 15, 2022  Video encounter occurred in the presence of patient's mother.  The left knee is still swollen; she had it drained again on December 7 due to pain and swelling. The knee remains painful with walking; she has some relief with celebrex once or twice daily, also with joint brace.    Interval history December 6, 2022    On 10-28 2022, she did an intense workout with cardio. She noted pain after the workout in many areas. Throughout the night, she had increasing pain in her left knee. She then had increased swelling in the knee over ensuing weeks. It prevented her from normal walking; she could not work out at the gym.    Patient was seen in sports medicine in November 28, 2022.  Impression was of chronic juvenile onset arthritis with recurrent left knee effusion.  History of prior intra-articular injections in the left knee was reviewed with the conclusion that the most recent injection has been performed over 2 years prior to the appointment.  Recommendation was to make a trial of physical therapy and home exercise program.  Aspiration and injection of the left knee was performed.    She had injection a week ago. Pain and swelling have since then improved, but when walking around, she has had some L knee pain.   However yesterday, she woke up with \"inflammation\" in the knee with stiffness,  Decreased walking ability. No redness, + warmth,  +modest tenderness " "around the knee.    No other joint symptoms.  No fevers  She noted a rash, starting in the Fall, none for a few weeks, red splotches on face and neck; saw Derm; she was told \"dermatitis\" allergic reaction possibly to skn care product.  +dry eyes for 6 months; difficulty to wear contacts, she will switch to shorter duration contacts. She stopped taking sertraline; she lost weight, and drinks water/coffee.    She has been using humira 40 mg every other week and describes strict adherence with med since early Fall.    Interval history May 27, 2022    She developed fever, fatigue, weakness, and ST, sinus congestion last week. She was tested for viral pathogens, all negative. She used fluids, rest, and acetaminophen; fever has broken, but ST persists.    Before the recent illness, she had been exercising regularly with gym workouts or 3-4 mile walks. Minimal joint pain. She reports noJIA flareups in months, and she feels much more confident about exercise without inducing flare.     She notes occasional knee discomfort for a few days before scheduled humira injections. She has been regular with using humira. Still has red/puffy at site lasting 1-2 days, occasionally bruising.    She will start new job at [employer]    Interval history 10-:    Other than recent cold, health is good. Her knees have not flared recently. She is using humira regularly. She does note warm/redness at site after injection, lasting less than 24 hours.   Occasionally she notes slight extra stiffness or discomfort in the lead up to scheduled humira dosing. No early morning stiffness. She does not use NSAIDs for joint pain.   Time constraints have limited aerobic exercise, but walking extenstively does not exacerbate knee or joint pain.    she is active socially and athletically.  She is carrying a 15 credit load in college.    Background hx 8-, transition to adult care:  At the age of 4 patient developed problems with swelling in her " "knees and hands. Her parents noted alteration in her knee anatomy during soccer practice. She ultimately was diagnosed as having juvenile rheumatoid arthritis with many involved joints (fingers, toes, and knees) and saw Dr. MORALES Silva, a pediatric rheumatologist at [facility]. She was treated with methotrexate and at the age of 11 went into remission. At age 12, she had stopped medication, but her left knee remained swollen. She had an injection in the left knee.    Since then, she reports several instances of swelling and pain in her knee. Those were short lived. In late 2019 she began having recurrent difficulties with pain and swelling in the left knee.  She saw Dr. Mckenzie in rheumatology at [facility].  Flare of inflammatory arthritis in the left knee was diagnosed, and the knee was aspirated and injected.  She saw Dr. Mckenzie in follow-up virtually on Patito 3, 2020.  At that time excellent and durable response to   left knee injections was reported, but the left knee had again become swollen, stiff, and painful.  At that point, the plan was to use scheduled naproxen and to start sulfasalazine.  In July, she followed up with Dr. Mckenzie, and reported poor tolerance of naproxen and intermittent continued left knee swelling and pain.  Dr. Mckenzie recommended advancing the dose of sulfasalazine.    On July 11, the L knee again swelling, and was aspirated and injected again. All the swelling has since disappeared. She does not have pain on a daily basis. She has been hiking and canoeing without difficulty. Somestimes, the left knee feels \"off\" compared to the R, but no swelling/loss of ROM. No redness/warmth.    She has had no problems with pain or swelling in joints elsewhere recently with the exception of pain in the right TMJ region.    In the recent past she has been using 600 mg twice daily of ibuprofen. She developed stomach upset and chest pain. She stopped it and that has " improved. Sulfasalazine has also been difficult to take because of heartburn, and she has taken it only occasionally at reduced doses since visiting with Dr. Mckenzie.    She has been under increasing stress at school. She has had no history of psoriasis or photosensitivity. There is no history of iritis or uveitis, dry eyes or dry mouth, she has had no problems with pleurisy or asthma. There is no history of stomach ulcers or inflammatory bowel disease. She has had no problems with kidney or liver disease, diabetes or thyroid disease, cytopenias or neurologic disease. There is no history of hypertension.            Review of Systems:       Constitutional: none  Skin: neg  Eyes: no eye pain or vision change  Ears/Nose/Throat: negative  Respiratory: she has SOB, associated with anxiety  Cardiovascular: negative  Gastrointestinal: + GERD symptoms, stable  Genitourinary: negative  Musculoskeletal: negative  Neurologic: negative  Psychiatric: negative  Hematologic/Lymphatic/Immunologic: neg  Endocrine: negative          Past Medical History:     Past Medical History:   Diagnosis Date    Anxiety     Depressive disorder     Juvenile arthritis (H) 01/2004     Past Surgical History:   Procedure Laterality Date    CARDIAC SURGERY N/A 01/2004     # congenital heart disease: septal defect which was treated with a coiling procedure at age 4.  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  Activity: She walks regularly         Family History:     Family History   Problem Relation Age of Onset    Cancer Maternal Grandmother     Cancer Paternal Grandmother      Cancer Paternal Grandfather      Family History: Negative with respect to inflammatory disease.       Allergies:   No Known Allergies         Medications:     Current Outpatient Medications   Medication Sig Dispense Refill    Abatacept (ORENCIA) 125 MG/ML SOAJ auto-injector Inject 1 mL (125 mg) Subcutaneous every 7 days 4 mL 4    FLUoxetine (PROZAC) 10 MG capsule Take 10 mg by mouth every morning      norgestimate-ethinyl estradiol (ORTHO-CYCLEN) 0.25-35 MG-MCG tablet Take 1 tablet by mouth      celecoxib (CELEBREX) 100 MG capsule Take 1 tab once or twice daily for knee pain and swelling. 60 capsule 1    diphenhydrAMINE-zinc acetate (BENADRYL) 2-0.1 % external cream Apply topically 3 times daily as needed for itching (Patient not taking: Reported on 12/6/2022) 28 g 1    sertraline (ZOLOFT) 50 MG tablet Take 50 mg by mouth daily              Physical Exam:   Blood pressure 101/67, pulse 64, weight 52.2 kg (115 lb), last menstrual period 08/18/2023, SpO2 99 %.  Wt Readings from Last 4 Encounters:   08/24/23 52.2 kg (115 lb)   12/07/22 62.6 kg (138 lb)   11/28/22 62.6 kg (138 lb)   05/27/22 64.5 kg (142 lb 3.2 oz)     Body mass index is 20.37 kg/m .  Constitutional: well-developed, appearing stated age; cooperative  Eyes: nl EOM, PERRLA, vision, conjunctiva, sclera  ENT: nl external ears, nose, hearing, lips, teeth, gums, throat; mild micrognathia  No mucous membrane lesions, normal saliva pool  Neck: no mass or thyroid enlargement  Resp: breathing unlabored  Lymph: no cervical, supraclavicular, inguinal or epitrochlear nodes  MS: The TMJ, neck, shoulder, elbow, wrist, MCP/PIP/DIP, spine, hip, knee, ankle, and foot MTP/IP joints were examined. No joint swelling.  Left knee without warmth, redness, tenderness, or altered range of motion.  Skin: no nail pitting, alopecia, rash, nodules or lesions  Neuro: nl cranial nerves, strength, sensation, DTRs.   Psych: nl judgement, orientation, memory, affect.          Data:          Latest Ref Rng & Units 1/11/2022     1:03 PM 6/1/2022    11:51 AM 9/2/2022    11:11 AM   RHEUM RESULTS   ALT 0 - 50 U/L 16      AST 0 - 45 U/L 20      Creatinine 0.52 - 1.04 mg/dL 0.74   0.78    CRP Inflammation <5.00 mg/L   10.30    GFR Estimate >60 mL/min/1.73m2 >90   >90    Hematocrit 35.0 - 47.0 % 40.0   39.9    Hemoglobin 11.7 - 15.7 g/dL 13.0   13.5    WBC 4.0 - 11.0 10e3/uL 4.4   5.9    RBC Count 3.80 - 5.20 10e6/uL 4.57   4.50    RDW 10.0 - 15.0 % 12.2   12.1    MCHC 31.5 - 36.5 g/dL 32.5   33.8    MCV 78 - 100 fL 88   89    Platelet Count 150 - 450 10e3/uL 153   212    Quantiferon-TB Gold Plus Result Negative  Negative

## 2023-08-24 ENCOUNTER — OFFICE VISIT (OUTPATIENT)
Dept: RHEUMATOLOGY | Facility: CLINIC | Age: 23
End: 2023-08-24
Payer: COMMERCIAL

## 2023-08-24 VITALS
SYSTOLIC BLOOD PRESSURE: 101 MMHG | HEART RATE: 64 BPM | DIASTOLIC BLOOD PRESSURE: 67 MMHG | OXYGEN SATURATION: 99 % | WEIGHT: 115 LBS | BODY MASS INDEX: 20.37 KG/M2

## 2023-08-24 DIAGNOSIS — M08.90 CHRONIC ARTHRITIS OF JUVENILE ONSET (H): Primary | ICD-10-CM

## 2023-08-24 PROCEDURE — 99214 OFFICE O/P EST MOD 30 MIN: CPT | Performed by: INTERNAL MEDICINE

## 2023-08-24 RX ORDER — FLUOXETINE 10 MG/1
10 CAPSULE ORAL EVERY MORNING
COMMUNITY
Start: 2023-07-19

## 2023-08-24 ASSESSMENT — PAIN SCALES - GENERAL: PAINLEVEL: NO PAIN (0)

## 2023-08-24 NOTE — PATIENT INSTRUCTIONS
Dx:  Juvenile inflammatory arthritis: Left knee swelling/pain has reduced markedly. AYDEN is improved with orencia. I recommend continuing orencia.  2.  Chronic sinus congestion.    Plan:    1.  Blood work for creatinine, basic metabolic panel, CBC, and CRP.  2.  Continue Orencia 125 mg subcu once weekly.  If subcutaneous Orencia is financially not feasible, but infusion therapy is better from a financial clinic for review, I would expect identical benefit from monthly infusion of Orencia.  3.  Discuss patient assistance program by consulting with medication therapy monitoring service  4.  Discuss chronic sinus congestion, potentially related to allergy, with primary care.

## 2023-08-24 NOTE — NURSING NOTE
Chief Complaint   Patient presents with    RECHECK     Chronic arthritis of juvenile onset       Vitals:    08/24/23 0803   BP: 101/67   BP Location: Left arm   Patient Position: Sitting   Cuff Size: Adult Regular   Pulse: 64   SpO2: 99%   Weight: 52.2 kg (115 lb)       Body mass index is 20.37 kg/m .    Issa Dougherty MA

## 2023-08-30 ENCOUNTER — VIRTUAL VISIT (OUTPATIENT)
Dept: RHEUMATOLOGY | Facility: CLINIC | Age: 23
End: 2023-08-30
Attending: INTERNAL MEDICINE
Payer: COMMERCIAL

## 2023-08-30 DIAGNOSIS — Z30.018 HORMONAL CONTRACEPTIVE: ICD-10-CM

## 2023-08-30 DIAGNOSIS — M08.80 JIA (JUVENILE IDIOPATHIC ARTHRITIS) (H): Primary | ICD-10-CM

## 2023-08-30 DIAGNOSIS — F32.A DEPRESSION: ICD-10-CM

## 2023-08-30 NOTE — LETTER
8/30/2023       RE: Shabnam Yu  [id number] [facility] [address]     Dear Colleague,    Thank you for referring your patient, Shabnam Yu, to the [facility] RHEUMATOLOGY CLINIC [place] at [facility]. Please see a copy of my visit note below.    Medication Therapy Management (MTM) Encounter    ASSESSMENT:                            Medication Adherence/Access: See below for more information.     Juvenile Idiopathic Arthritis:   Patient well controlled on current therapy. Concerned that  co-pay card will run out of money midway through 2024 and medication will not be affordable. Patient currently in school and not working. Likely would qualify for Orencia patient assistance program. Can sign up for assistance program once  co-pay card is out of money. Other options to consider include Orencia infusions or changing medications. Patient attends school in [place] which could complicate infusions.     Depression/Anxiety: Stable    Hormonal Contraception: Stable    PLAN:                            Spoke with pharmacy liaison about Orencia patient assistance program. Able to sign up when  co-pay card is completely out of money. Liaison team will be able to assist in signing up for program. Communicate periodically to ensure continuation of therapy.     Continue on Orencia 125 mg subcutaneously every week.    Get labs done at earliest convenience.  Dr. Keith put in the orders.     Follow-up: Return in 8 weeks (on 10/27/2023) for MTM pharmacist visit.    SUBJECTIVE/OBJECTIVE:                          Shabnam Yu is a 23 year old female called for an initial visit. She was referred to me from Quang Keith MD.      Reason for visit: Orencia co-pay assistance.    Allergies/ADRs: Reviewed in chart  Past Medical History: Reviewed in chart  Tobacco: She reports that she has never smoked. She has  never used smokeless tobacco.  Alcohol: 1-3 beverages / week    Medication Adherence/Access: Co-pay assistance through the  will not last all the way through 2024.     Juvenile Idiopathic Arthritis:   Orencia 125 mg weekly  Celecoxib 100 mg twice daily as needed     The patient's main concern for today's visit was discussing her Orencia coverage. She is currently using the 's co-pay assistance card which has a preset amount of money on it. Patient reported that she gets more money on the card during her first year of therapy than she does for subsequent years. Noted that the card should last her through the rest of this year, but will likely run out midway through 2024. Patient attending graduate school in [place] and not currently working, and is curious about what her options are if she cannot afford the medication. Mentioned she was on Humira in the past and never had issues with cost, but stopped the medication due to eventual lack of effect. Patient has no concerns with current therapy and reported her symptoms are well controlled. She is not taking celecoxib regularly - only using for severe flares which are rare.     Depression/Anxiety:  Fluoxetine 10 mg daily     Reported medication helping a lot with mood. Noticed initial side effects of insomnia and anxiousness. Side effects resolved after 2-4 weeks of therapy. No other side effects or concerns reported.     Hormonal Contraception:   Norgestimate-ethinyl estradiol 0.25-35 mg-mcg daily     No concerns or side effects reported.     Today's Vitals: LMP 08/18/2023 (Approximate)   ----------------    I spent 30 minutes with this patient today. All changes were made via collaborative practice agreement with Quang Keith MD. A copy of the visit note was provided to the patient's provider(s).    A summary of these recommendations was sent via [employer].    Frank Hughes PharmD  Medication Therapy Management Pharmacist  [facility]  Rheumatology Clinic  Phone: [phone]    Telemedicine Visit Details  Type of service:  Telephone visit  Start Time:  10:00 AM  End Time:  10:30 AM     Medication Therapy Recommendations  AYDEN (juvenile idiopathic arthritis) (H)    Current Medication: Abatacept (ORENCIA) 125 MG/ML SOAJ auto-injector   Rationale: Cannot afford medication product - Cost - Adherence   Recommendation: Referral to Service  - Sign up for patient assistance program once co-pay card runs out of money   Status: Patient Agreed - Adherence/Education                  Again, thank you for allowing me to participate in the care of your patient.      Sincerely,    Frank Hughes RPH

## 2023-08-31 ENCOUNTER — LAB (OUTPATIENT)
Dept: LAB | Facility: CLINIC | Age: 23
End: 2023-08-31
Payer: COMMERCIAL

## 2023-08-31 DIAGNOSIS — M08.90 CHRONIC ARTHRITIS OF JUVENILE ONSET (H): ICD-10-CM

## 2023-08-31 LAB
ANION GAP SERPL CALCULATED.3IONS-SCNC: 9 MMOL/L (ref 7–15)
BUN SERPL-MCNC: 9.8 MG/DL (ref 6–20)
CALCIUM SERPL-MCNC: 9.5 MG/DL (ref 8.6–10)
CHLORIDE SERPL-SCNC: 104 MMOL/L (ref 98–107)
CREAT SERPL-MCNC: 0.82 MG/DL (ref 0.51–0.95)
CRP SERPL-MCNC: <3 MG/L
DEPRECATED HCO3 PLAS-SCNC: 25 MMOL/L (ref 22–29)
ERYTHROCYTE [DISTWIDTH] IN BLOOD BY AUTOMATED COUNT: 11.9 % (ref 10–15)
GFR SERPL CREATININE-BSD FRML MDRD: >90 ML/MIN/1.73M2
GLUCOSE SERPL-MCNC: 85 MG/DL (ref 70–99)
HCT VFR BLD AUTO: 40 % (ref 35–47)
HGB BLD-MCNC: 13.9 G/DL (ref 11.7–15.7)
MCH RBC QN AUTO: 31.1 PG (ref 26.5–33)
MCHC RBC AUTO-ENTMCNC: 34.8 G/DL (ref 31.5–36.5)
MCV RBC AUTO: 90 FL (ref 78–100)
PLATELET # BLD AUTO: 194 10E3/UL (ref 150–450)
POTASSIUM SERPL-SCNC: 4.1 MMOL/L (ref 3.4–5.3)
RBC # BLD AUTO: 4.47 10E6/UL (ref 3.8–5.2)
SODIUM SERPL-SCNC: 138 MMOL/L (ref 136–145)
WBC # BLD AUTO: 4.8 10E3/UL (ref 4–11)

## 2023-08-31 PROCEDURE — 80048 BASIC METABOLIC PNL TOTAL CA: CPT

## 2023-08-31 PROCEDURE — 86140 C-REACTIVE PROTEIN: CPT

## 2023-08-31 PROCEDURE — 36415 COLL VENOUS BLD VENIPUNCTURE: CPT

## 2023-08-31 PROCEDURE — 85027 COMPLETE CBC AUTOMATED: CPT

## 2023-08-31 NOTE — PATIENT INSTRUCTIONS
"Recommendations from today's MTM visit:                                                    MTM (medication therapy management) is a service provided by a clinical pharmacist designed to help you get the most of out of your medicines.      Spoke with pharmacy liaison about Orencia patient assistance program. Able to sign up when  co-pay card is completely out of money. Liaison team will be able to assist in signing up for program. Communicate periodically to ensure continuation of therapy.     Continue on Orencia 125 mg subcutaneously every week.    Get labs done at earliest convenience.  Dr. Keith put in the orders.     Follow-up: Return in 8 weeks (on 10/27/2023) for MTM pharmacist visit.    It was great speaking with you today.  I value your experience and would be very thankful for your time in providing feedback in our clinic survey. In the next few days, you may receive an email or text message from [employer] with a link to a survey related to your  clinical pharmacist.\"     To schedule another MTM appointment, please call the clinic directly or you may call the MTM scheduling line at [phone] or toll-free at [phone].     My Clinical Pharmacist's contact information:                                                      Please feel free to contact me with any questions or concerns you have.      Frank Hughes, Richie  Medication Therapy Management Pharmacist  [facility] Rheumatology Clinic  Phone: [phone]   "

## 2023-10-27 ENCOUNTER — VIRTUAL VISIT (OUTPATIENT)
Dept: RHEUMATOLOGY | Facility: CLINIC | Age: 23
End: 2023-10-27
Attending: INTERNAL MEDICINE
Payer: COMMERCIAL

## 2023-10-27 DIAGNOSIS — M08.80 JIA (JUVENILE IDIOPATHIC ARTHRITIS) (H): Primary | ICD-10-CM

## 2023-10-27 NOTE — Clinical Note
10/27/2023       RE: Shabnam Yu  [id number] [facility] [address]     Dear Colleague,    Thank you for referring your patient, Shabnam Yu, to the [facility] RHEUMATOLOGY CLINIC [place] at [facility]. Please see a copy of my visit note below.    Medication Therapy Management (MTM) Encounter    ASSESSMENT:                            Medication Adherence/Access: See below for considerations    Juvenile Idiopathic Arthritis: Stable. Well controlled on current therapy. Will plan to check in midway through 2024 for co-pay card balance. Patient should qualify for patient assistance program if the co-pay card runs out of funds.     PLAN:                            Continue Orencia 125 mg every week.     Follow-up: will check in with patient around April/May 2024.     SUBJECTIVE/OBJECTIVE:                          Shabnam Yu is a 23 year old female called for a follow-up visit from 8/30/23.       Reason for visit: Orencia assistance follow up.    Allergies/ADRs: Reviewed in chart  Past Medical History: Reviewed in chart  Tobacco: She reports that she has never smoked. She has never used smokeless tobacco.  Alcohol: 1-3 beverages / week    Medication Adherence/Access: Co-pay assistance through  will not last through 2024.     Juvenile Idiopathic Arthritis:   Orencia 125 mg weekly  Celecoxib 100 mg twice daily as needed   Patient reported still doing well on therapy. Has been controlled for awhile and has no side effects or concerns at this time. Planning to reach out in early-mid 2024 to discuss co-pay card funds and patient assistance programs. Expects card to run out midway through 2024. Patient attending graduate school in [place] and not currently working. Stopped Humira in the past due to lack of effect.     Today's Vitals: There were no vitals taken for this visit.  ----------------    I spent 10 minutes with this  patient today. All changes were made via collaborative practice agreement with Quang Keith MD. A copy of the visit note was provided to the patient's provider(s).    A summary of these recommendations was sent via [employer].    Frank Hughes PharmD  Medication Therapy Management Pharmacist  [facility] Rheumatology Clinic  Phone: [phone]     Telemedicine Visit Details  Type of service:  Telephone visit  Start Time:  3:30 PM  End Time:  3:40PM     Medication Therapy Recommendations  No medication therapy recommendations to display           Again, thank you for allowing me to participate in the care of your patient.      Sincerely,    Frank Hughes RPH

## 2023-10-27 NOTE — PATIENT INSTRUCTIONS
"Recommendations from today's MTM visit:                                                       Continue Orencia 125 mg every week.     Follow-up: will check in with patient around April/May 2024.     It was great speaking with you today.  I value your experience and would be very thankful for your time in providing feedback in our clinic survey. In the next few days, you may receive an email or text message from [facility] [employer] with a link to a survey related to your  clinical pharmacist.\"     To schedule another MTM appointment, please call the clinic directly or you may call the MTM scheduling line at [phone] or toll-free at [phone].     My Clinical Pharmacist's contact information:                                                      Please feel free to contact me with any questions or concerns you have.      Frank Hughes, PharmD  Medication Therapy Management Pharmacist  [facility] Rheumatology Clinic  Phone: [phone]    "

## 2023-10-27 NOTE — PROGRESS NOTES
Medication Therapy Management (MTM) Encounter    ASSESSMENT:                            Medication Adherence/Access: See below for considerations    Juvenile Idiopathic Arthritis: Stable. Well controlled on current therapy. Will plan to check in midway through 2024 for co-pay card balance. Patient is in school and not working, so should qualify for patient assistance program if the co-pay card runs out of funds.     PLAN:                            Continue Orencia 125 mg every week.     Follow-up: will check in with patient around April/May 2024.     SUBJECTIVE/OBJECTIVE:                          Shabnam Yu is a 23 year old female called for a follow-up visit from 8/30/23.       Reason for visit: Orencia assistance follow up.    Allergies/ADRs: Reviewed in chart  Past Medical History: Reviewed in chart  Tobacco: She reports that she has never smoked. She has never used smokeless tobacco.  Alcohol: 1-3 beverages / week    Medication Adherence/Access: Co-pay assistance through  will not last through 2024.     Juvenile Idiopathic Arthritis:   Orencia 125 mg weekly  Celecoxib 100 mg twice daily as needed   Patient reported still doing well on therapy. Has been controlled for awhile and has no side effects or concerns at this time. Planning to reach out in early-mid 2024 to discuss co-pay card funds and patient assistance programs. Expects card to run out midway through 2024. Patient attending graduate school in [place] and not currently working. Stopped Humira in the past due to lack of effect.     Today's Vitals: There were no vitals taken for this visit.  ----------------    I spent 10 minutes with this patient today. All changes were made via collaborative practice agreement with Quang Keith MD. A copy of the visit note was provided to the patient's provider(s).    A summary of these recommendations was sent via "[employer].    Frank Hughes, Richie  Medication Therapy Management Pharmacist  [employer]  [facility] Rheumatology Clinic  Phone: [phone]     Telemedicine Visit Details  Type of service:  Telephone visit  Start Time:  3:30 PM  End Time:  3:40PM     Medication Therapy Recommendations  No medication therapy recommendations to display

## 2023-11-08 DIAGNOSIS — M08.90 CHRONIC ARTHRITIS OF JUVENILE ONSET (H): ICD-10-CM

## 2023-11-10 RX ORDER — ABATACEPT 125 MG/ML
INJECTION, SOLUTION SUBCUTANEOUS
Qty: 4 ML | Refills: 4 | Status: SHIPPED | OUTPATIENT
Start: 2023-11-10 | End: 2024-03-26

## 2023-12-27 NOTE — PROGRESS NOTES
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  Physical exam shows no inflammatory joint changes.    Data: Lab work on August 31, 2023 showed basic metabolic panel, CRP, CBC, all normal.    Synovial fluid analysis from November 28 2022 left knee aspiration showed 5000 white cells.    Imaging: MRI of the left knee on December 8, 2022 showed large effusion with extensive synovial enhancement and synovitis.  No internal derangements or stress fractures noted.    Discussion: Oligoarthritis affecting primarily left knee, due to juvenile idiopathic arthritis persistent into adulthood, remains greatly improved/in remission since mid winter 2022- 23.  Improvement has correlated tightly with initiation of abatacept.  I recommend continued use of abatacept for symptom relief, and for protection against long-term joint damage, given the drug's excellent safety and tolerability profile, and demonstrated utility in juvenile inflammatory arthritis.  I discussed the possibility of intravenous infusion as an alternative, should abatacept subcutaneously not prove financially feasible. Additional alternatives include anti-IL-6 (Actemra), and Luis Eduardo inhibitors, but these options are much less desirable because they have not been tried before and efficacy would not be guaranteed.    # Chronic sinus congestion, sneezing, itching: Symptoms are suggestive of an allergic/environmental stimulated rhinitis.  I agree with plan follow-up with otolaryngology; if no infectious or structural issues are identified, consider allergy/immunology referral versus empiric trials of  antihistamine.    Plan:    1.  Blood work for kidney, liver function,CBC, and CRP.  2.  Continue Orencia 125 mg subcu once weekly.  If subcutaneous Orencia is financially not feasible, but infusion therapy is better from a financial clinic for review, I would expect identical benefit from monthly infusion of Orencia.  If symptoms remain greater than 90% suppressed by next visit, consider reducing frequency of Orencia to find a minimum effective dose.  3.  Discuss patient assistance program in consultation with medication therapy monitoring service  4.  Discuss chronic sinus congestion, potentially related to allergy, with primary care.  Agree with planned ENT consultation.    RTC 7 mos    Quang Keith MD  Staff Rheumatologist, [facility]       Orders Placed This Encounter   Procedures    Comprehensive metabolic panel    CRP inflammation    Med Therapy Management Referral    CBC with platelets differential     On the day of the encounter, a total of 25 minutes was spent in chart review, and in counseling and coordination of care, regarding the patient's complex medical problem of left knee synovitis on the background of juvenile onset inflammatory oligoarthritis.          Active Problem List:     Patient Active Problem List    Diagnosis Date Noted    AYDEN (juvenile idiopathic arthritis) (H) 07/14/2014     Priority: Medium    Myopia 07/14/2014     Priority: Medium            History of Present Illness:   Shabnam Yu follows up for juvenile rheumatoid arthritis. She was last seen 8-2023. Oligoarticular inflammatory arthritis was improved. Recommendation was to continue Orencia.    Interval history December 28, 2023    Knee is only occasionally sore/achy. No swelling.  No early morning stiffness.  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  However, she is not requiring corticosteroids or regular use of nonsteroidals to maintain control of former left knee symptoms.    She notes  "sinus congestion/pain for about 1 year. Definitely worse at school than at home. Worse in the mornings. There is itchiness and sneezing in the mornings. She sometimes notes blood in nasal secretions in last few weeks. No epistaxis. No fevers,headaches.  Seeing ENT next week. Has tried OTC allergy remedies sudafed/allergy med; they help but cause edginess.    Using orencia weekly; minimal AE.    Interval history August 24, 2023    Today, she reports that knees have been doing Ok. No major flares since winter 2022-23. No visible knee swelling. Minimal disruptino of ADLs. On the day of/before orencia she feels achiness in the knee. 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    She feels that orencia has been instrumental in sustained improvement she has noted in her knees.   She has noted difficulty with obtaining orencia, moreso than with humira. Patient assistance program will give less assistance than she had previously.    Patient has required no corticosteroids, either injected or oral, and she has stopped using celecoxib entirely.    Patient visited with ophthalmology on August 23, 2023.  Records are not available for review today, she was told that there was \"no evidence\" of uveitis or retinal disease.    Patient reports sinus \"pressure\", worse upon awakening each day, and associated with clear drainage, for several months.  She had an episode of sinus infection that was treated with antibiotics.  Symptoms associated with that event have improved.    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  She now resides near [place] and has primary care through physicians assistant there.      Interval history December 15, 2022  Video encounter occurred in the presence of patient's mother.  The left knee is still swollen; she had it drained again on December 7 due to pain and swelling. The knee remains painful with walking; she " "has some relief with celebrex once or twice daily, also with joint brace.    Interval history December 6, 2022    On 10-28 2022, she did an intense workout with cardio. She noted pain after the workout in many areas. Throughout the night, she had increasing pain in her left knee. She then had increased swelling in the knee over ensuing weeks. It prevented her from normal walking; she could not work out at the gym.    Patient was seen in sports medicine in November 28, 2022.  Impression was of chronic juvenile onset arthritis with recurrent left knee effusion.  History of prior intra-articular injections in the left knee was reviewed with the conclusion that the most recent injection has been performed over 2 years prior to the appointment.  Recommendation was to make a trial of physical therapy and home exercise program.  Aspiration and injection of the left knee was performed.    She had injection a week ago. Pain and swelling have since then improved, but when walking around, she has had some L knee pain.   However yesterday, she woke up with \"inflammation\" in the knee with stiffness,  Decreased walking ability. No redness, + warmth,  +modest tenderness around the knee.    No other joint symptoms.  No fevers  She noted a rash, starting in the Fall, none for a few weeks, red splotches on face and neck; saw Derm; she was told \"dermatitis\" allergic reaction possibly to skn care product.  +dry eyes for 6 months; difficulty to wear contacts, she will switch to shorter duration contacts. She stopped taking sertraline; she lost weight, and drinks water/coffee.    She has been using humira 40 mg every other week and describes strict adherence with med since early Fall.    Interval history May 27, 2022    She developed fever, fatigue, weakness, and ST, sinus congestion last week. She was tested for viral pathogens, all negative. She used fluids, rest, and acetaminophen; fever has broken, but ST persists.    Before the " recent illness, she had been exercising regularly with gym workouts or 3-4 mile walks. Minimal joint pain. She reports noJIA flareups in months, and she feels much more confident about exercise without inducing flare.     She notes occasional knee discomfort for a few days before scheduled humira injections. She has been regular with using humira. Still has red/puffy at site lasting 1-2 days, occasionally bruising.    She will start new job at [employer]    Interval history 10-:    Other than recent cold, health is good. Her knees have not flared recently. She is using humira regularly. She does note warm/redness at site after injection, lasting less than 24 hours.   Occasionally she notes slight extra stiffness or discomfort in the lead up to scheduled humira dosing. No early morning stiffness. She does not use NSAIDs for joint pain.   Time constraints have limited aerobic exercise, but walking extenstively does not exacerbate knee or joint pain.    she is active socially and athletically.  She is carrying a 15 credit load in college.    Background hx 9-, transition to adult care:  At the age of 4 patient developed problems with swelling in her knees and hands. Her parents noted alteration in her knee anatomy during soccer practice. She ultimately was diagnosed as having juvenile rheumatoid arthritis with many involved joints (fingers, toes, and knees) and saw Dr. MORALES Silva, a pediatric rheumatologist at [facility]. She was treated with methotrexate and at the age of 11 went into remission. At age 12, she had stopped medication, but her left knee remained swollen. She had an injection in the left knee.    Since then, she reports several instances of swelling and pain in her knee. Those were short lived. In late 2019 she began having recurrent difficulties with pain and swelling in the left knee.  She saw Dr. Mckenzie in rheumatology at [facility].  Flare of inflammatory arthritis in  "the left knee was diagnosed, and the knee was aspirated and injected.  She saw Dr. Mckenzie in follow-up virtually on Patito 3, 2020.  At that time excellent and durable response to   left knee injections was reported, but the left knee had again become swollen, stiff, and painful.  At that point, the plan was to use scheduled naproxen and to start sulfasalazine.  In July, she followed up with Dr. Mckenzie, and reported poor tolerance of naproxen and intermittent continued left knee swelling and pain.  Dr. Mckenzie recommended advancing the dose of sulfasalazine.    On July 11, the L knee again swelling, and was aspirated and injected again. All the swelling has since disappeared. She does not have pain on a daily basis. She has been hiking and canoeing without difficulty. Somestimes, the left knee feels \"off\" compared to the R, but no swelling/loss of ROM. No redness/warmth.    She has had no problems with pain or swelling in joints elsewhere recently with the exception of pain in the right TMJ region.    In the recent past she has been using 600 mg twice daily of ibuprofen. She developed stomach upset and chest pain. She stopped it and that has improved. Sulfasalazine has also been difficult to take because of heartburn, and she has taken it only occasionally at reduced doses since visiting with Dr. Mckenzie.    She has been under increasing stress at school. She has had no history of psoriasis or photosensitivity. There is no history of iritis or uveitis, dry eyes or dry mouth, she has had no problems with pleurisy or asthma. There is no history of stomach ulcers or inflammatory bowel disease. She has had no problems with kidney or liver disease, diabetes or thyroid disease, cytopenias or neurologic disease. There is no history of hypertension.            Review of Systems:       Constitutional: none  Skin: neg  Eyes: no eye pain or vision change  Ears/Nose/Throat: negative  Respiratory: she has SOB, " associated with anxiety  Cardiovascular: negative  Gastrointestinal: + GERD symptoms, stable  Genitourinary: negative  Musculoskeletal: negative  Neurologic: negative  Psychiatric: negative  Hematologic/Lymphatic/Immunologic: neg  Endocrine: negative          Past Medical History:     Past Medical History:   Diagnosis Date    Anxiety     Depressive disorder     Juvenile arthritis (H) 01/2004     Past Surgical History:   Procedure Laterality Date    CARDIAC SURGERY N/A 01/2004     # congenital heart disease: septal defect which was treated with a coiling procedure at age 4.  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  Activity: She walks regularly         Family History:     Family History   Problem Relation Age of Onset    Cancer Maternal Grandmother     Cancer Paternal Grandmother     Cancer Paternal Grandfather      Family History: Negative with respect to inflammatory disease.       Allergies:   No Known Allergies         Medications:     Current Outpatient Medications   Medication Sig Dispense Refill    FLUoxetine (PROZAC) 10 MG capsule Take 10 mg by mouth every morning      norgestimate-ethinyl estradiol (ORTHO-CYCLEN) 0.25-35 MG-MCG tablet Take 1 tablet by mouth      ORENCIA CLICKJECT 125 MG/ML SOAJ auto-injector INJECT ONE CLICKJECT PEN SUBCUTANEOUSLY EVERY WEEK. REFRIGERATE. ALLOW TO WARM TO ROOM TEMPERATURE PRIOR TO ADMINISTRATION. 4 mL 4    celecoxib (CELEBREX) 100 MG capsule Take 1 tab once or twice daily for knee pain and swelling. (Patient not taking: Reported on 12/28/2023) 60 capsule 1            Physical Exam:   Blood pressure  "91/63, pulse 62, resp. rate 14, height 1.6 m (5' 3\"), weight 54 kg (119 lb), SpO2 98%.  Wt Readings from Last 4 Encounters:   12/28/23 54 kg (119 lb)   08/24/23 52.2 kg (115 lb)   12/07/22 62.6 kg (138 lb)   11/28/22 62.6 kg (138 lb)     Body mass index is 21.08 kg/m .  Constitutional: well-developed, appearing stated age; cooperative  Eyes/ENT: Chemosis of palpebral conjunctivae; mild global injection of sclera; erythema at the nares without friable or ulcerated nasal mucosa.  Mild micrognathia  No mucous membrane lesions, normal saliva pool  Neck: no mass or thyroid enlargement  Resp: breathing unlabored  Lymph: no cervical, supraclavicular, inguinal or epitrochlear nodes  MS:  Left knee without warmth, redness, tenderness, or altered range of motion.  Skin: no nail pitting, alopecia, rash, nodules or lesions  Neuro: nl cranial nerves, strength, sensation, DTRs.   Psych: nl judgement, orientation, memory, affect.          Data:         Latest Ref Rng & Units 6/1/2022    11:51 AM 9/2/2022    11:11 AM 8/31/2023    11:40 AM   RHEUM RESULTS   Creatinine 0.51 - 0.95 mg/dL  0.78  0.82    CRP Inflammation <5.00 mg/L  10.30  <3.00    GFR Estimate >60 mL/min/1.73m2  >90  >90    Hematocrit 35.0 - 47.0 %  39.9  40.0    Hemoglobin 11.7 - 15.7 g/dL  13.5  13.9    WBC 4.0 - 11.0 10e3/uL  5.9  4.8    RBC Count 3.80 - 5.20 10e6/uL  4.50  4.47    RDW 10.0 - 15.0 %  12.1  11.9    MCHC 31.5 - 36.5 g/dL  33.8  34.8    MCV 78 - 100 fL  89  90    Platelet Count 150 - 450 10e3/uL  212  194    Quantiferon-TB Gold Plus Result Negative Negative        "

## 2023-12-28 ENCOUNTER — OFFICE VISIT (OUTPATIENT)
Dept: RHEUMATOLOGY | Facility: CLINIC | Age: 23
End: 2023-12-28
Payer: COMMERCIAL

## 2023-12-28 VITALS
RESPIRATION RATE: 14 BRPM | HEART RATE: 62 BPM | HEIGHT: 63 IN | OXYGEN SATURATION: 98 % | DIASTOLIC BLOOD PRESSURE: 63 MMHG | WEIGHT: 119 LBS | SYSTOLIC BLOOD PRESSURE: 91 MMHG | BODY MASS INDEX: 21.09 KG/M2

## 2023-12-28 DIAGNOSIS — M08.80 JIA (JUVENILE IDIOPATHIC ARTHRITIS) (H): Primary | ICD-10-CM

## 2023-12-28 PROCEDURE — 99214 OFFICE O/P EST MOD 30 MIN: CPT | Performed by: INTERNAL MEDICINE

## 2023-12-28 ASSESSMENT — PAIN SCALES - GENERAL: PAINLEVEL: NO PAIN (0)

## 2023-12-28 NOTE — PATIENT INSTRUCTIONS
Dx:  Juvenile inflammatory arthritis: Left knee swelling/pain continues markedly suppressed.  AYDEN is improved with orencia. I recommend continuing orencia.  2.  Chronic sinus congestion, suspect allergic/environmental cause rather than sinusitis or systemic inflammatory disease.    Plan:    1.  Blood work for kidney, liver function,CBC, and CRP.  2.  Continue Orencia 125 mg subcu once weekly.  If subcutaneous Orencia is financially not feasible, but infusion therapy is better from a financial clinic for review, I would expect identical benefit from monthly infusion of Orencia.  If symptoms remain greater than 90% suppressed by next visit, consider reducing frequency of Orencia to find a minimum effective dose.  3.  Discuss patient assistance program in consultation with medication therapy monitoring service  4.  Discuss chronic sinus congestion, potentially related to allergy, with primary care.  Agree with planned ENT consultation.

## 2023-12-28 NOTE — NURSING NOTE
"Chief Complaint   Patient presents with    RECHECK     Chronic arthritis of juvenile onset       Vitals:    12/28/23 1000   BP: 91/63   BP Location: Left arm   Patient Position: Sitting   Cuff Size: Adult Regular   Pulse: 62   Resp: 14   SpO2: 98%   Weight: 54 kg (119 lb)   Height: 1.6 m (5' 3\")       Body mass index is 21.08 kg/m .    Jammie Peres, [facility]  "

## 2024-01-02 ENCOUNTER — LAB (OUTPATIENT)
Dept: LAB | Facility: CLINIC | Age: 24
End: 2024-01-02
Payer: COMMERCIAL

## 2024-01-02 DIAGNOSIS — M08.80 JIA (JUVENILE IDIOPATHIC ARTHRITIS) (H): ICD-10-CM

## 2024-01-02 LAB
ALBUMIN SERPL BCG-MCNC: 4.2 G/DL (ref 3.5–5.2)
ALP SERPL-CCNC: 50 U/L (ref 40–150)
ALT SERPL W P-5'-P-CCNC: 8 U/L (ref 0–50)
ANION GAP SERPL CALCULATED.3IONS-SCNC: 8 MMOL/L (ref 7–15)
AST SERPL W P-5'-P-CCNC: 23 U/L (ref 0–45)
BASOPHILS # BLD AUTO: 0 10E3/UL (ref 0–0.2)
BASOPHILS NFR BLD AUTO: 1 %
BILIRUB SERPL-MCNC: 0.5 MG/DL
BUN SERPL-MCNC: 9.6 MG/DL (ref 6–20)
CALCIUM SERPL-MCNC: 9.2 MG/DL (ref 8.6–10)
CHLORIDE SERPL-SCNC: 105 MMOL/L (ref 98–107)
CREAT SERPL-MCNC: 0.73 MG/DL (ref 0.51–0.95)
CRP SERPL-MCNC: 5.23 MG/L
DEPRECATED HCO3 PLAS-SCNC: 25 MMOL/L (ref 22–29)
EGFRCR SERPLBLD CKD-EPI 2021: >90 ML/MIN/1.73M2
EOSINOPHIL # BLD AUTO: 0.3 10E3/UL (ref 0–0.7)
EOSINOPHIL NFR BLD AUTO: 6 %
ERYTHROCYTE [DISTWIDTH] IN BLOOD BY AUTOMATED COUNT: 12 % (ref 10–15)
GLUCOSE SERPL-MCNC: 89 MG/DL (ref 70–99)
HCT VFR BLD AUTO: 38.7 % (ref 35–47)
HGB BLD-MCNC: 13.2 G/DL (ref 11.7–15.7)
IMM GRANULOCYTES # BLD: 0 10E3/UL
IMM GRANULOCYTES NFR BLD: 0 %
LYMPHOCYTES # BLD AUTO: 2 10E3/UL (ref 0.8–5.3)
LYMPHOCYTES NFR BLD AUTO: 46 %
MCH RBC QN AUTO: 30.8 PG (ref 26.5–33)
MCHC RBC AUTO-ENTMCNC: 34.1 G/DL (ref 31.5–36.5)
MCV RBC AUTO: 90 FL (ref 78–100)
MONOCYTES # BLD AUTO: 0.3 10E3/UL (ref 0–1.3)
MONOCYTES NFR BLD AUTO: 8 %
NEUTROPHILS # BLD AUTO: 1.7 10E3/UL (ref 1.6–8.3)
NEUTROPHILS NFR BLD AUTO: 39 %
PLATELET # BLD AUTO: 160 10E3/UL (ref 150–450)
POTASSIUM SERPL-SCNC: 4.2 MMOL/L (ref 3.4–5.3)
PROT SERPL-MCNC: 7.4 G/DL (ref 6.4–8.3)
RBC # BLD AUTO: 4.28 10E6/UL (ref 3.8–5.2)
SODIUM SERPL-SCNC: 138 MMOL/L (ref 135–145)
WBC # BLD AUTO: 4.3 10E3/UL (ref 4–11)

## 2024-01-02 PROCEDURE — 86140 C-REACTIVE PROTEIN: CPT

## 2024-01-02 PROCEDURE — 36415 COLL VENOUS BLD VENIPUNCTURE: CPT

## 2024-01-02 PROCEDURE — 85025 COMPLETE CBC W/AUTO DIFF WBC: CPT

## 2024-01-02 PROCEDURE — 80053 COMPREHEN METABOLIC PANEL: CPT

## 2024-01-03 ENCOUNTER — TELEPHONE (OUTPATIENT)
Dept: RHEUMATOLOGY | Facility: CLINIC | Age: 24
End: 2024-01-03
Payer: COMMERCIAL

## 2024-01-03 NOTE — TELEPHONE ENCOUNTER
Prior Authorization Specialty Medication Request    Medication/Dose: ORENCIA CLICKJECT 125 MG/ML SOAJ auto-injector  Diagnosis and ICD code (if different than what is on RX):  M08.90       Insurance   Primary: [facility] CHOICE   Insurance ID: [id number]

## 2024-01-09 NOTE — TELEPHONE ENCOUNTER
Call received from [facility], they just wanted to make sure this PA request had been received and that it was currently being worked on.   To reach [facility], please call [phone].

## 2024-01-23 NOTE — TELEPHONE ENCOUNTER
PA Initiation    Medication: ORENCIA CLICKJECT 125 MG/ML SC SOAJ  Insurance Company: [employer] - Phone [phone] Fax [phone]  Pharmacy Filling the Rx: [facility] - [place], [place] - [address]  Filling Pharmacy Phone:    Filling Pharmacy Fax:    Start Date: 1/23/2024    [id number]

## 2024-01-23 NOTE — TELEPHONE ENCOUNTER
Prior Authorization Approval    Medication: ORENCIA CLICKJECT 125 MG/ML SC SOAJ  Authorization Effective Date: 1/23/2024  Authorization Expiration Date: 1/23/2025  Approved Dose/Quantity: 4 per 28 days  Reference #: [id number]   Insurance Company: [employer] - Phone [phone] Fax [phone]  Expected CoPay: $    CoPay Card Available: Yes    Financial Assistance Needed: Informing patient of reimbursement method instead of pharmacy processing co-pay card  Which Pharmacy is filling the prescription: [facility] - [place], [place] - [address] [place] [address]  Pharmacy Notified: Faxed approval  Patient Notified: Bryanna

## 2024-03-26 DIAGNOSIS — M08.90 CHRONIC ARTHRITIS OF JUVENILE ONSET (H): ICD-10-CM

## 2024-03-26 RX ORDER — ABATACEPT 125 MG/ML
INJECTION, SOLUTION SUBCUTANEOUS
Qty: 4 ML | Refills: 4 | Status: SHIPPED | OUTPATIENT
Start: 2024-03-26 | End: 2024-07-18

## 2024-03-26 NOTE — TELEPHONE ENCOUNTER
Reordering Orencia injection per MTM pharmacist CPA with Quang Keith MD. Patient up to date on routine lab monitoring. Plan is to continue therapy.     Frank Hughes, PharmD  Medication Therapy Management Pharmacist  [facility]umat[facility]

## 2024-05-30 NOTE — PROGRESS NOTES
Medication Therapy Management (MTM) Encounter    ASSESSMENT:                            Medication Adherence/Access: See below for more information.     Juvenile Idiopathic Arthritis:   Patient well controlled on current therapy. Concerned that  co-pay card will run out of money midway through 2024 and medication will not be affordable. Patient currently in school and not working. Likely would qualify for Orencia patient assistance program. Can sign up for assistance program once  co-pay card is out of money. Other options to consider include Orencia infusions or changing medications. Patient attends school in [place] which could complicate infusions.     Depression/Anxiety: Stable    Hormonal Contraception: Stable    PLAN:                            Spoke with pharmacy liaison about Orencia patient assistance program. Able to sign up when  co-pay card is completely out of money. Liaison team will be able to assist in signing up for program. Communicate periodically to ensure continuation of therapy.     Continue on Orencia 125 mg subcutaneously every week.    Get labs done at earliest convenience.  Dr. Keith put in the orders.     Follow-up: Return in 8 weeks (on 10/27/2023) for MTM pharmacist visit.    SUBJECTIVE/OBJECTIVE:                          Shabnam Yu is a 23 year old female called for an initial visit. She was referred to me from Quang Keith MD.      Reason for visit: Orencia co-pay assistance.    Allergies/ADRs: Reviewed in chart  Past Medical History: Reviewed in chart  Tobacco: She reports that she has never smoked. She has never used smokeless tobacco.  Alcohol: 1-3 beverages / week    Medication Adherence/Access: Co-pay assistance through the  will not last all the way through 2024.     Juvenile Idiopathic Arthritis:   Orencia 125 mg weekly  Celecoxib 100 mg twice daily as needed     The patient's main concern for today's visit was  discussing her Orencia coverage. She is currently using the 's co-pay assistance card which has a preset amount of money on it. Patient reported that she gets more money on the card during her first year of therapy than she does for subsequent years. Noted that the card should last her through the rest of this year, but will likely run out midway through 2024. Patient attending graduate school in [place] and not currently working, and is curious about what her options are if she cannot afford the medication. Mentioned she was on Humira in the past and never had issues with cost, but stopped the medication due to eventual lack of effect. Patient has no concerns with current therapy and reported her symptoms are well controlled. She is not taking celecoxib regularly - only using for severe flares which are rare.     Depression/Anxiety:  Fluoxetine 10 mg daily     Reported medication helping a lot with mood. Noticed initial side effects of insomnia and anxiousness. Side effects resolved after 2-4 weeks of therapy. No other side effects or concerns reported.     Hormonal Contraception:   Norgestimate-ethinyl estradiol 0.25-35 mg-mcg daily     No concerns or side effects reported.     Today's Vitals: LMP 08/18/2023 (Approximate)   ----------------    I spent 30 minutes with this patient today. All changes were made via collaborative practice agreement with Quang Keith MD. A copy of the visit note was provided to the patient's provider(s).    A summary of these recommendations was sent via M.dot.    Frank Hughes PharmD  Medication Therapy Management Pharmacist  [facility]  Phone: [phone]    Telemedicine Visit Details  Type of service:  Telephone visit  Start Time:  10:00 AM  End Time:  10:30 AM     Medication Therapy Recommendations  AYDEN (juvenile idiopathic arthritis) (H)    Current Medication: Abatacept (ORENCIA) 125 MG/ML SOAJ auto-injector   Rationale: Cannot afford  medication product - Cost - Adherence   Recommendation: Referral to Service  - Sign up for patient assistance program once co-pay card runs out of money   Status: Patient Agreed - Adherence/Education                 Never smoker

## 2024-06-08 ENCOUNTER — HEALTH MAINTENANCE LETTER (OUTPATIENT)
Age: 24
End: 2024-06-08

## 2024-07-17 NOTE — PROGRESS NOTES
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  Physical exam shows no inflammatory joint changes.    Data: In January 2024, comprehensive metabolic panel was normal; CBC was normal; CRP was minimally elevated at 5.23    Synovial fluid analysis from November 28 2022 left knee aspiration showed 5000 white cells.    Imaging: MRI of the left knee on December 8, 2022 showed large effusion with extensive synovial enhancement and synovitis.  No internal derangements or stress fractures noted.    Discussion: Oligoarthritis affecting primarily left knee, due to juvenile idiopathic arthritis persistent into adulthood, remains greatly improved/in remission since mid winter 2022- 23.  Improvement correlated tightly with initiation of abatacept.  I recommend continued use of abatacept for symptom relief, and for protection against long-term joint damage.  I now recommend a trial of gradual reduction in frequency of abatacept dosing, with the expectation that dosing frequency less than weekly could provide comparable greater than 90% suppression of inflammatory symptoms.    Alternative therapies that may be considered if expense of/access to abatacept prohibits future use include anti-IL-6 (Actemra), and Luis Eduardo inhibitors.These options are less desirable because they have not been tried before and efficacy would not be guaranteed.    # Chronic sinus congestion, sneezing, itching: Did not discuss today. Symptoms are suggestive of an allergic/environmental stimulated rhinitis.  I agree with plan follow-up with otolaryngology; if no  infectious or structural issues are identified, consider allergy/immunology referral versus empiric trials of antihistamine.    Plan:    1.  Blood work for basic metabolic panel, CBC with platelets.  2.  Continue Orencia 125 mg subcu.  Reduce frequency to 1 injection every 10 days until September 1, 2024.  If no recurrence of inflammatory symptoms, swelling, warmth, pain with reduced dosing frequency, I recommend reducing frequency to once every 14 days for the next 6 months.  3.  Discuss abatacept patient assistance programs with medication therapy monitoring pharmacist; cost of acquisition for use of alternatives tofacitinib or tocilizumab may also be researched.    RTC 8 mos    Quang Keith MD  Staff Rheumatologist, [facility]    On the day of the encounter, a total of 31 minutes was spent in chart review, and in counseling and coordination of care, regarding the patient's complex medical problem of juvenile idiopathic arthritis, oligoarticular variant, high risk medication.    The longitudinal plan of care for the diagnosis(es)/condition(s) as documented were addressed during this visit. Due to the added complexity in care, I will continue to support Shabnam in the subsequent management and with ongoing continuity of care.           No orders of the defined types were placed in this encounter.    On the day of the encounter, a total of 25 minutes was spent in chart review, and in counseling and coordination of care, regarding the patient's complex medical problem of left knee synovitis on the background of juvenile onset inflammatory oligoarthritis.          Active Problem List:     Patient Active Problem List    Diagnosis Date Noted    AYDEN (juvenile idiopathic arthritis) (H) 07/14/2014     Priority: Medium    Myopia 07/14/2014     Priority: Medium            History of Present Illness:   Shabnam GUTIERREZ Luis Danielrosa isela follows up for juvenile rheumatoid arthritis. She was last seen . Oligoarticular inflammatory  arthritis was improved. Recommendation was to continue Orencia.    Interval history July 18, 2024    Knee is only occasionally sore/achy after a long day standing or after sitting/immobility. No persistent swelling.  No early morning stiffness.  No warmth, redness, or weightbearing associated pain. She  does not require regular use of nonsteroidals or other over-the-counter remedies.    She has been dosing 125 mg orencia weekly. No AE.  No injection site reactions.  She does have concern about financial viability of orencia long-term.    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    Interval history December 28, 2023    Knee is only occasionally sore/achy. No swelling.  No early morning stiffness.  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  However, she is not requiring corticosteroids or regular use of nonsteroidals to maintain control of former left knee symptoms.    She notes sinus congestion/pain for about 1 year. Definitely worse at school than at home. Worse in the mornings. There is itchiness and sneezing in the mornings. She sometimes notes blood in nasal secretions in last few weeks. No epistaxis. No fevers,headaches.  Seeing ENT next week. Has tried OTC allergy remedies sudafed/allergy med; they help but cause edginess.    Using orencia weekly; minimal AE.    Interval history August 24, 2023    Today, she reports that knees have been doing Ok. No major flares since winter 2022-23. No visible knee swelling. Minimal disruptino of ADLs. On the day of/before orencia she feels achiness in the knee. 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    She feels that orencia has been instrumental in sustained improvement she has noted in her knees.   She has noted difficulty with obtaining orencia, moreso than with humira. Patient assistance program will give less  "assistance than she had previously.    Patient has required no corticosteroids, either injected or oral, and she has stopped using celecoxib entirely.    Patient visited with ophthalmology on August 23, 2023.  Records are not available for review today, she was told that there was \"no evidence\" of uveitis or retinal disease.    Patient reports sinus \"pressure\", worse upon awakening each day, and associated with clear drainage, for several months.  She had an episode of sinus infection that was treated with antibiotics.  Symptoms associated with that event have improved.    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  She now resides near [place] and has primary care through physicians assistant there.      Interval history December 15, 2022  Video encounter occurred in the presence of patient's mother.  The left knee is still swollen; she had it drained again on December 7 due to pain and swelling. The knee remains painful with walking; she has some relief with celebrex once or twice daily, also with joint brace.    Interval history December 6, 2022    On 10-28 2022, she did an intense workout with cardio. She noted pain after the workout in many areas. Throughout the night, she had increasing pain in her left knee. She then had increased swelling in the knee over ensuing weeks. It prevented her from normal walking; she could not work out at the gym.    Patient was seen in sports medicine in November 28, 2022.  Impression was of chronic juvenile onset arthritis with recurrent left knee effusion.  History of prior intra-articular injections in the left knee was reviewed with the conclusion that the most recent injection has been performed over 2 years prior to the appointment.  Recommendation was to make a trial of physical therapy and home exercise program.  Aspiration and injection of the left knee was performed.    She had injection a week ago. Pain and swelling have since then " "improved, but when walking around, she has had some L knee pain.   However yesterday, she woke up with \"inflammation\" in the knee with stiffness,  Decreased walking ability. No redness, + warmth,  +modest tenderness around the knee.    No other joint symptoms.  No fevers  She noted a rash, starting in the Fall, none for a few weeks, red splotches on face and neck; saw Derm; she was told \"dermatitis\" allergic reaction possibly to skn care product.  +dry eyes for 6 months; difficulty to wear contacts, she will switch to shorter duration contacts. She stopped taking sertraline; she lost weight, and drinks water/coffee.    She has been using humira 40 mg every other week and describes strict adherence with med since early Fall.    Interval history May 27, 2022    She developed fever, fatigue, weakness, and ST, sinus congestion last week. She was tested for viral pathogens, all negative. She used fluids, rest, and acetaminophen; fever has broken, but ST persists.    Before the recent illness, she had been exercising regularly with gym workouts or 3-4 mile walks. Minimal joint pain. She reports noJIA flareups in months, and she feels much more confident about exercise without inducing flare.     She notes occasional knee discomfort for a few days before scheduled humira injections. She has been regular with using humira. Still has red/puffy at site lasting 1-2 days, occasionally bruising.    She will start new job at [employer]    Interval history 10-:    Other than recent cold, health is good. Her knees have not flared recently. She is using humira regularly. She does note warm/redness at site after injection, lasting less than 24 hours.   Occasionally she notes slight extra stiffness or discomfort in the lead up to scheduled humira dosing. No early morning stiffness. She does not use NSAIDs for joint pain.   Time constraints have limited aerobic exercise, but walking extenstively does not exacerbate knee or joint " "pain.    she is active socially and athletically.  She is carrying a 15 credit load in college.    Background hx 9-, transition to adult care:  At the age of 4 patient developed problems with swelling in her knees and hands. Her parents noted alteration in her knee anatomy during soccer practice. She ultimately was diagnosed as having juvenile rheumatoid arthritis with many involved joints (fingers, toes, and knees) and saw Dr. MORALES Silva, a pediatric rheumatologist at [facility]. She was treated with methotrexate and at the age of 11 went into remission. At age 12, she had stopped medication, but her left knee remained swollen. She had an injection in the left knee.    Since then, she reports several instances of swelling and pain in her knee. Those were short lived. In late 2019 she began having recurrent difficulties with pain and swelling in the left knee.  She saw Dr. Mckenzie in rheumatology at [facility].  Flare of inflammatory arthritis in the left knee was diagnosed, and the knee was aspirated and injected.  She saw Dr. Mckenzie in follow-up virtually on Patito 3, 2020.  At that time excellent and durable response to   left knee injections was reported, but the left knee had again become swollen, stiff, and painful.  At that point, the plan was to use scheduled naproxen and to start sulfasalazine.  In July, she followed up with Dr. Mckenzie, and reported poor tolerance of naproxen and intermittent continued left knee swelling and pain.  Dr. Mckenzie recommended advancing the dose of sulfasalazine.    On July 11, the L knee again swelling, and was aspirated and injected again. All the swelling has since disappeared. She does not have pain on a daily basis. She has been hiking and canoeing without difficulty. Somestimes, the left knee feels \"off\" compared to the R, but no swelling/loss of ROM. No redness/warmth.    She has had no problems with pain or swelling in joints " elsewhere recently with the exception of pain in the right TMJ region.    In the recent past she has been using 600 mg twice daily of ibuprofen. She developed stomach upset and chest pain. She stopped it and that has improved. Sulfasalazine has also been difficult to take because of heartburn, and she has taken it only occasionally at reduced doses since visiting with Dr. Mckenzie.    She has been under increasing stress at school. She has had no history of psoriasis or photosensitivity. There is no history of iritis or uveitis, dry eyes or dry mouth, she has had no problems with pleurisy or asthma. There is no history of stomach ulcers or inflammatory bowel disease. She has had no problems with kidney or liver disease, diabetes or thyroid disease, cytopenias or neurologic disease. There is no history of hypertension.            Review of Systems:       Constitutional: none  Skin: neg  Eyes: no eye pain or vision change  Ears/Nose/Throat: negative  Respiratory: she has SOB, associated with anxiety  Cardiovascular: negative  Gastrointestinal: + GERD symptoms, stable  Genitourinary: negative  Musculoskeletal: negative  Neurologic: negative  Psychiatric: negative  Hematologic/Lymphatic/Immunologic: neg  Endocrine: negative          Past Medical History:     Past Medical History:   Diagnosis Date    Anxiety     Depressive disorder     Juvenile arthritis (H) 01/2004     Past Surgical History:   Procedure Laterality Date    CARDIAC SURGERY N/A 01/2004     # congenital heart disease: septal defect which was treated with a coiling procedure at age 4.  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       Allergies:   No Known Allergies         Medications:     Current Outpatient Medications   Medication Sig Dispense Refill    FLUoxetine (PROZAC) 10 MG capsule Take 10 mg by mouth every morning      norgestimate-ethinyl estradiol (ORTHO-CYCLEN) 0.25-35 MG-MCG tablet Take 1 tablet by mouth      ORENCIA CLICKJECT 125 MG/ML SOAJ auto-injector INJECT ONE CLICKJECT PEN SUBCUTANEOUSLY EVERY WEEK. REFRIGERATE. ALLOW TO WARM TO ROOM TEMPERATURE PRIOR TO ADMINISTRATION. 4 mL 4    celecoxib (CELEBREX) 100 MG capsule Take 1 tab once or twice daily for knee pain and swelling. (Patient not taking: Reported on 12/28/2023) 60 capsule 1            Physical Exam:   Blood pressure 103/55, pulse 71, weight 54 kg (119 lb).  Wt Readings from Last 4 Encounters:   07/18/24 54 kg (119 lb)   12/28/23 54 kg (119 lb)   08/24/23 52.2 kg (115 lb)   12/07/22 62.6 kg (138 lb)     Body mass index is 21.08 kg/m .  Constitutional: well-developed, appearing stated age; cooperative  Eyes/ENT: Chemosis of palpebral conjunctivae; mild global injection of sclera; erythema at the nares without friable or ulcerated nasal mucosa.  Mild micrognathia  No mucous membrane lesions, normal saliva pool  Neck: no mass or thyroid enlargement  Resp: breathing unlabored  Lymph: no cervical, supraclavicular, inguinal or epitrochlear nodes  MS:  Left knee without warmth, redness, tenderness, or altered range of motion.  Skin: no nail pitting, alopecia, rash, nodules or lesions  Neuro: nl cranial nerves, strength, sensation, DTRs.   Psych: nl judgement, orientation, memory, affect.          Data:         Latest Ref Rng & Units 9/2/2022    11:11  AM 8/31/2023    11:40 AM 1/2/2024    11:52 AM   RHEUM RESULTS   Albumin 3.5 - 5.2 g/dL   4.2    ALT 0 - 50 U/L   8    AST 0 - 45 U/L   23    Creatinine 0.51 - 0.95 mg/dL 0.78  0.82  0.73    CRP Inflammation <5.00 mg/L 10.30  <3.00  5.23    GFR Estimate >60 mL/min/1.73m2 >90  >90  >90    Hematocrit 35.0 - 47.0 % 39.9  40.0  38.7    Hemoglobin 11.7 - 15.7 g/dL 13.5  13.9  13.2    WBC 4.0 - 11.0 10e3/uL 5.9  4.8  4.3    RBC Count 3.80 - 5.20 10e6/uL 4.50  4.47  4.28    RDW 10.0 - 15.0 % 12.1  11.9  12.0    MCHC 31.5 - 36.5 g/dL 33.8  34.8  34.1    MCV 78 - 100 fL 89  90  90    Platelet Count 150 - 450 10e3/uL 212  194  160

## 2024-07-18 ENCOUNTER — OFFICE VISIT (OUTPATIENT)
Dept: RHEUMATOLOGY | Facility: CLINIC | Age: 24
End: 2024-07-18
Payer: COMMERCIAL

## 2024-07-18 VITALS
SYSTOLIC BLOOD PRESSURE: 103 MMHG | WEIGHT: 119 LBS | DIASTOLIC BLOOD PRESSURE: 55 MMHG | BODY MASS INDEX: 21.08 KG/M2 | HEART RATE: 71 BPM

## 2024-07-18 DIAGNOSIS — M08.90 CHRONIC ARTHRITIS OF JUVENILE ONSET (H): ICD-10-CM

## 2024-07-18 PROCEDURE — 99214 OFFICE O/P EST MOD 30 MIN: CPT | Performed by: INTERNAL MEDICINE

## 2024-07-18 PROCEDURE — G2211 COMPLEX E/M VISIT ADD ON: HCPCS | Performed by: INTERNAL MEDICINE

## 2024-07-18 RX ORDER — ABATACEPT 125 MG/ML
125 INJECTION, SOLUTION SUBCUTANEOUS
Qty: 4 ML | Refills: 6 | Status: SHIPPED | OUTPATIENT
Start: 2024-07-18

## 2024-07-18 ASSESSMENT — PAIN SCALES - GENERAL: PAINLEVEL: NO PAIN (0)

## 2024-07-18 NOTE — NURSING NOTE
Chief Complaint   Patient presents with    RECHECK     AYDEN (juvenile idiopathic arthritis       Vitals:    07/18/24 1448   BP: 103/55   BP Location: Left arm   Patient Position: Sitting   Cuff Size: Adult Regular   Pulse: 71   Weight: 54 kg (119 lb)       Body mass index is 21.08 kg/m .    Lindsey Jolly, [facility]     Schedule eye appointment.  Start eye drops today.    Monitor for any increased redness, pain with eye movement, swelling/redness around the eyes, or new fevers

## 2024-07-18 NOTE — PATIENT INSTRUCTIONS
Dx:  Juvenile inflammatory arthritis: Chronic left knee swelling/pain continues markedly suppressed, now for at least 12 months..  AYDEN is improved with orencia. I recommend continuing orencia, but making a trial of finding a minimum effective dose by gradually reducing the frequency of Orencia dosing.    Plan:    1.  Blood work for basic metabolic panel, CBC with platelets today and every 6-8 months while using abatacept.  2.  Continue Orencia 125 mg subcu.  Reduce frequency to 1 injection every 10 days until September 1, 2024.  If no recurrence of inflammatory symptoms, swelling, warmth, pain with reduced dosing frequency, I recommend reducing frequency to once every 14 days for the next 6 months.  3.  Discuss abatacept patient assistance programs with medication therapy monitoring pharmacist; cost of acquisition for use of alternatives tofacitinib or tocilizumab may also be researched.

## 2024-12-10 ENCOUNTER — TELEPHONE (OUTPATIENT)
Dept: RHEUMATOLOGY | Facility: CLINIC | Age: 24
End: 2024-12-10
Payer: COMMERCIAL

## 2024-12-10 NOTE — TELEPHONE ENCOUNTER
[facility] Call Center    Phone Message    May a detailed message be left on voicemail: yes     Reason for Call: Medication Question or concern regarding medication   Prescription Clarification  Name of Medication: Abatacept (ORENCIA CLICKJECT) 125 MG/ML SOAJ auto-injector   Prescribing Provider: Dr. Keith   Pharmacy: [facility] ([id number] - [address]    What on the order needs clarification? Betsy is calling to provide the prior authorization Cover My Med Key# [id number]    If there are any questions, please give him a call, they will call back once there is an update.       Action Taken: Other: Rheum    Travel Screening: Not Applicable     Date of Service: 12/10/24

## 2024-12-11 NOTE — TELEPHONE ENCOUNTER
PA Initiation    Medication: ORENCIA CLICKJECT 125 MG/ML SC SOAJ  Insurance Company: [facility] [employer] - Phone [phone] Fax [phone]  Pharmacy Filling the Rx:    Filling Pharmacy Phone:    Filling Pharmacy Fax:    Start Date: 12/11/2024  [id number])      MARGE Ramirez, [facility]  [facility]    [email or web address]     Phone: [phone]  Fax: [phone]

## 2025-05-16 ENCOUNTER — TELEPHONE (OUTPATIENT)
Dept: RHEUMATOLOGY | Facility: CLINIC | Age: 25
End: 2025-05-16
Payer: COMMERCIAL

## 2025-05-16 NOTE — TELEPHONE ENCOUNTER
Prior Authorization Not Needed per Insurance    Medication: ORENCIA CLICKJECT 125 MG/ML SC SOAJ  Insurance Company: [employer]  Expected CoPay: $    Pharmacy Filling the Rx: [facility] - [place], [place] - [address]    Sent message to patient with [facility] contact info.

## 2025-06-15 ENCOUNTER — HEALTH MAINTENANCE LETTER (OUTPATIENT)
Age: 25
End: 2025-06-15